# Patient Record
Sex: FEMALE | Race: WHITE | NOT HISPANIC OR LATINO
[De-identification: names, ages, dates, MRNs, and addresses within clinical notes are randomized per-mention and may not be internally consistent; named-entity substitution may affect disease eponyms.]

---

## 2017-04-17 ENCOUNTER — APPOINTMENT (OUTPATIENT)
Dept: OBGYN | Facility: CLINIC | Age: 67
End: 2017-04-17

## 2017-04-17 VITALS
DIASTOLIC BLOOD PRESSURE: 74 MMHG | BODY MASS INDEX: 22.47 KG/M2 | SYSTOLIC BLOOD PRESSURE: 119 MMHG | WEIGHT: 119 LBS | HEIGHT: 61 IN

## 2017-04-17 DIAGNOSIS — N94.818 OTHER VULVODYNIA: ICD-10-CM

## 2017-04-17 DIAGNOSIS — N89.8 OTHER SPECIFIED NONINFLAMMATORY DISORDERS OF VAGINA: ICD-10-CM

## 2017-04-17 RX ORDER — ROSUVASTATIN CALCIUM 20 MG/1
20 TABLET, FILM COATED ORAL
Qty: 90 | Refills: 0 | Status: COMPLETED | COMMUNITY
Start: 2016-11-02

## 2017-04-17 RX ORDER — AMOXICILLIN AND CLAVULANATE POTASSIUM 875; 125 MG/1; MG/1
875-125 TABLET, COATED ORAL
Qty: 20 | Refills: 0 | Status: COMPLETED | COMMUNITY
Start: 2017-03-23

## 2017-04-19 ENCOUNTER — OTHER (OUTPATIENT)
Age: 67
End: 2017-04-19

## 2017-04-19 LAB
CANDIDA VAG CYTO: NOT DETECTED
G VAGINALIS+PREV SP MTYP VAG QL MICRO: DETECTED
T VAGINALIS VAG QL WET PREP: NOT DETECTED

## 2017-04-19 RX ORDER — METRONIDAZOLE 7.5 MG/G
0.75 GEL VAGINAL
Qty: 7 | Refills: 1 | Status: ACTIVE | COMMUNITY
Start: 2017-04-19 | End: 1900-01-01

## 2020-02-20 ENCOUNTER — APPOINTMENT (OUTPATIENT)
Dept: OBGYN | Facility: CLINIC | Age: 70
End: 2020-02-20

## 2020-03-04 ENCOUNTER — APPOINTMENT (OUTPATIENT)
Dept: OBGYN | Facility: CLINIC | Age: 70
End: 2020-03-04
Payer: COMMERCIAL

## 2020-03-04 VITALS — BODY MASS INDEX: 23.24 KG/M2 | SYSTOLIC BLOOD PRESSURE: 132 MMHG | DIASTOLIC BLOOD PRESSURE: 78 MMHG | WEIGHT: 123 LBS

## 2020-03-04 DIAGNOSIS — Z01.419 ENCOUNTER FOR GYNECOLOGICAL EXAMINATION (GENERAL) (ROUTINE) W/OUT ABNORMAL FINDINGS: ICD-10-CM

## 2020-03-04 PROCEDURE — 99397 PER PM REEVAL EST PAT 65+ YR: CPT

## 2020-03-04 PROCEDURE — 82270 OCCULT BLOOD FECES: CPT

## 2020-03-04 NOTE — PHYSICAL EXAM
[Awake] : awake [Alert] : alert [Acute Distress] : no acute distress [Mass] : no breast mass [Nipple Discharge] : no nipple discharge [Axillary LAD] : no axillary lymphadenopathy [Soft] : soft [Oriented x3] : oriented to person, place, and time [Tender] : non tender [Normal] : vagina [No Bleeding] : there was no active vaginal bleeding [Absent] : absent [Uterine Adnexae] : were not tender and not enlarged

## 2020-03-04 NOTE — HISTORY OF PRESENT ILLNESS
[___ Year(s) Ago] : [unfilled] year(s) ago [Regular Exercise] : regular exercise [Healthy Diet] : a healthy diet [Weight Concerns] : no concerns with her weight [Last Mammogram ___] : Last Mammogram was [unfilled] [Last Bone Density ___] : Last bone density studies [unfilled] [Last Colonoscopy ___] : Last colonoscopy [unfilled] [Postmenopausal] : is postmenopausal [Last Pap ___] : Last cervical pap smear was [unfilled] [Currently In Menopause] : currently in menopause [Experiencing Menopausal Sxs] : not experiencing menopausal symptoms [Menopause Age: ____] : age at menopause was [unfilled]

## 2020-08-14 ENCOUNTER — NON-APPOINTMENT (OUTPATIENT)
Age: 70
End: 2020-08-14

## 2022-02-11 ENCOUNTER — INPATIENT (INPATIENT)
Facility: HOSPITAL | Age: 72
LOS: 9 days | Discharge: HOME | DRG: 535 | End: 2022-02-21
Attending: INTERNAL MEDICINE | Admitting: INTERNAL MEDICINE
Payer: COMMERCIAL

## 2022-02-11 VITALS
WEIGHT: 123.02 LBS | SYSTOLIC BLOOD PRESSURE: 154 MMHG | RESPIRATION RATE: 18 BRPM | TEMPERATURE: 99 F | OXYGEN SATURATION: 100 % | HEART RATE: 81 BPM | DIASTOLIC BLOOD PRESSURE: 94 MMHG

## 2022-02-11 DIAGNOSIS — S32.9XXA FRACTURE OF UNSPECIFIED PARTS OF LUMBOSACRAL SPINE AND PELVIS, INITIAL ENCOUNTER FOR CLOSED FRACTURE: ICD-10-CM

## 2022-02-11 LAB
ALBUMIN SERPL ELPH-MCNC: 3.9 G/DL — SIGNIFICANT CHANGE UP (ref 3.3–5)
ALP SERPL-CCNC: 75 U/L — SIGNIFICANT CHANGE UP (ref 40–120)
ALT FLD-CCNC: 46 U/L — SIGNIFICANT CHANGE UP (ref 12–78)
ANION GAP SERPL CALC-SCNC: 4 MMOL/L — LOW (ref 5–17)
AST SERPL-CCNC: 32 U/L — SIGNIFICANT CHANGE UP (ref 15–37)
BASOPHILS # BLD AUTO: 0.03 K/UL — SIGNIFICANT CHANGE UP (ref 0–0.2)
BASOPHILS NFR BLD AUTO: 0.2 % — SIGNIFICANT CHANGE UP (ref 0–2)
BILIRUB SERPL-MCNC: 0.4 MG/DL — SIGNIFICANT CHANGE UP (ref 0.2–1.2)
BUN SERPL-MCNC: 15 MG/DL — SIGNIFICANT CHANGE UP (ref 7–23)
CALCIUM SERPL-MCNC: 9.2 MG/DL — SIGNIFICANT CHANGE UP (ref 8.5–10.1)
CHLORIDE SERPL-SCNC: 111 MMOL/L — HIGH (ref 96–108)
CO2 SERPL-SCNC: 27 MMOL/L — SIGNIFICANT CHANGE UP (ref 22–31)
CREAT SERPL-MCNC: 0.73 MG/DL — SIGNIFICANT CHANGE UP (ref 0.5–1.3)
EOSINOPHIL # BLD AUTO: 0.02 K/UL — SIGNIFICANT CHANGE UP (ref 0–0.5)
EOSINOPHIL NFR BLD AUTO: 0.1 % — SIGNIFICANT CHANGE UP (ref 0–6)
GLUCOSE SERPL-MCNC: 126 MG/DL — HIGH (ref 70–99)
HCT VFR BLD CALC: 41.8 % — SIGNIFICANT CHANGE UP (ref 34.5–45)
HGB BLD-MCNC: 14.3 G/DL — SIGNIFICANT CHANGE UP (ref 11.5–15.5)
IMM GRANULOCYTES NFR BLD AUTO: 0.8 % — SIGNIFICANT CHANGE UP (ref 0–1.5)
INR BLD: 1 RATIO — SIGNIFICANT CHANGE UP (ref 0.88–1.16)
LYMPHOCYTES # BLD AUTO: 1.2 K/UL — SIGNIFICANT CHANGE UP (ref 1–3.3)
LYMPHOCYTES # BLD AUTO: 7.2 % — LOW (ref 13–44)
MCHC RBC-ENTMCNC: 29.7 PG — SIGNIFICANT CHANGE UP (ref 27–34)
MCHC RBC-ENTMCNC: 34.2 GM/DL — SIGNIFICANT CHANGE UP (ref 32–36)
MCV RBC AUTO: 86.9 FL — SIGNIFICANT CHANGE UP (ref 80–100)
MONOCYTES # BLD AUTO: 0.81 K/UL — SIGNIFICANT CHANGE UP (ref 0–0.9)
MONOCYTES NFR BLD AUTO: 4.8 % — SIGNIFICANT CHANGE UP (ref 2–14)
NEUTROPHILS # BLD AUTO: 14.52 K/UL — HIGH (ref 1.8–7.4)
NEUTROPHILS NFR BLD AUTO: 86.9 % — HIGH (ref 43–77)
NRBC # BLD: 0 /100 WBCS — SIGNIFICANT CHANGE UP (ref 0–0)
PLATELET # BLD AUTO: 202 K/UL — SIGNIFICANT CHANGE UP (ref 150–400)
POTASSIUM SERPL-MCNC: 3.8 MMOL/L — SIGNIFICANT CHANGE UP (ref 3.5–5.3)
POTASSIUM SERPL-SCNC: 3.8 MMOL/L — SIGNIFICANT CHANGE UP (ref 3.5–5.3)
PROT SERPL-MCNC: 7.1 G/DL — SIGNIFICANT CHANGE UP (ref 6–8.3)
PROTHROM AB SERPL-ACNC: 11.7 SEC — SIGNIFICANT CHANGE UP (ref 10.6–13.6)
RBC # BLD: 4.81 M/UL — SIGNIFICANT CHANGE UP (ref 3.8–5.2)
RBC # FLD: 12.5 % — SIGNIFICANT CHANGE UP (ref 10.3–14.5)
SARS-COV-2 RNA SPEC QL NAA+PROBE: SIGNIFICANT CHANGE UP
SODIUM SERPL-SCNC: 142 MMOL/L — SIGNIFICANT CHANGE UP (ref 135–145)
WBC # BLD: 16.72 K/UL — HIGH (ref 3.8–10.5)
WBC # FLD AUTO: 16.72 K/UL — HIGH (ref 3.8–10.5)

## 2022-02-11 PROCEDURE — 93010 ELECTROCARDIOGRAM REPORT: CPT

## 2022-02-11 PROCEDURE — 72192 CT PELVIS W/O DYE: CPT | Mod: 26,MA

## 2022-02-11 PROCEDURE — 73502 X-RAY EXAM HIP UNI 2-3 VIEWS: CPT | Mod: 26,RT

## 2022-02-11 PROCEDURE — 99285 EMERGENCY DEPT VISIT HI MDM: CPT

## 2022-02-11 PROCEDURE — 71101 X-RAY EXAM UNILAT RIBS/CHEST: CPT | Mod: 26

## 2022-02-11 PROCEDURE — 76376 3D RENDER W/INTRP POSTPROCES: CPT | Mod: 26

## 2022-02-11 PROCEDURE — 72190 X-RAY EXAM OF PELVIS: CPT | Mod: 26,59

## 2022-02-11 PROCEDURE — 73552 X-RAY EXAM OF FEMUR 2/>: CPT | Mod: 26,RT

## 2022-02-11 RX ORDER — MORPHINE SULFATE 50 MG/1
4 CAPSULE, EXTENDED RELEASE ORAL ONCE
Refills: 0 | Status: DISCONTINUED | OUTPATIENT
Start: 2022-02-11 | End: 2022-02-11

## 2022-02-11 RX ORDER — ONDANSETRON 8 MG/1
4 TABLET, FILM COATED ORAL ONCE
Refills: 0 | Status: COMPLETED | OUTPATIENT
Start: 2022-02-11 | End: 2022-02-11

## 2022-02-11 RX ORDER — MORPHINE SULFATE 50 MG/1
2 CAPSULE, EXTENDED RELEASE ORAL
Refills: 0 | Status: DISCONTINUED | OUTPATIENT
Start: 2022-02-11 | End: 2022-02-13

## 2022-02-11 RX ORDER — DIAZEPAM 5 MG
2.5 TABLET ORAL ONCE
Refills: 0 | Status: DISCONTINUED | OUTPATIENT
Start: 2022-02-11 | End: 2022-02-11

## 2022-02-11 RX ORDER — OXYCODONE AND ACETAMINOPHEN 5; 325 MG/1; MG/1
1 TABLET ORAL EVERY 6 HOURS
Refills: 0 | Status: DISCONTINUED | OUTPATIENT
Start: 2022-02-11 | End: 2022-02-18

## 2022-02-11 RX ORDER — ENOXAPARIN SODIUM 100 MG/ML
40 INJECTION SUBCUTANEOUS DAILY
Refills: 0 | Status: DISCONTINUED | OUTPATIENT
Start: 2022-02-11 | End: 2022-02-21

## 2022-02-11 RX ORDER — ACETAMINOPHEN 500 MG
650 TABLET ORAL EVERY 6 HOURS
Refills: 0 | Status: DISCONTINUED | OUTPATIENT
Start: 2022-02-11 | End: 2022-02-21

## 2022-02-11 RX ORDER — HYDROMORPHONE HYDROCHLORIDE 2 MG/ML
0.5 INJECTION INTRAMUSCULAR; INTRAVENOUS; SUBCUTANEOUS ONCE
Refills: 0 | Status: DISCONTINUED | OUTPATIENT
Start: 2022-02-11 | End: 2022-02-11

## 2022-02-11 RX ORDER — SODIUM CHLORIDE 9 MG/ML
1000 INJECTION INTRAMUSCULAR; INTRAVENOUS; SUBCUTANEOUS ONCE
Refills: 0 | Status: COMPLETED | OUTPATIENT
Start: 2022-02-11 | End: 2022-02-11

## 2022-02-11 RX ADMIN — HYDROMORPHONE HYDROCHLORIDE 0.5 MILLIGRAM(S): 2 INJECTION INTRAMUSCULAR; INTRAVENOUS; SUBCUTANEOUS at 18:23

## 2022-02-11 RX ADMIN — SODIUM CHLORIDE 1000 MILLILITER(S): 9 INJECTION INTRAMUSCULAR; INTRAVENOUS; SUBCUTANEOUS at 17:34

## 2022-02-11 RX ADMIN — MORPHINE SULFATE 4 MILLIGRAM(S): 50 CAPSULE, EXTENDED RELEASE ORAL at 18:23

## 2022-02-11 RX ADMIN — HYDROMORPHONE HYDROCHLORIDE 0.5 MILLIGRAM(S): 2 INJECTION INTRAMUSCULAR; INTRAVENOUS; SUBCUTANEOUS at 19:32

## 2022-02-11 RX ADMIN — ONDANSETRON 4 MILLIGRAM(S): 8 TABLET, FILM COATED ORAL at 16:34

## 2022-02-11 RX ADMIN — Medication 2.5 MILLIGRAM(S): at 17:56

## 2022-02-11 RX ADMIN — MORPHINE SULFATE 4 MILLIGRAM(S): 50 CAPSULE, EXTENDED RELEASE ORAL at 16:34

## 2022-02-11 RX ADMIN — SODIUM CHLORIDE 1000 MILLILITER(S): 9 INJECTION INTRAMUSCULAR; INTRAVENOUS; SUBCUTANEOUS at 16:34

## 2022-02-11 NOTE — H&P ADULT - NSHPPHYSICALEXAM_GEN_ALL_CORE
General: WN/WD NAD  PERRLA  Neurology: A&Ox3, nonfocal, ULLOA x 4  Respiratory: CTA B/L  CV: RRR, S1S2, no murmurs, rubs or gallops  Abdominal: Soft, NT, ND +BS, Last BM  Extremities: No edema, + peripheral pulses  Skin Normal

## 2022-02-11 NOTE — ED PROVIDER NOTE - PROGRESS NOTE DETAILS
case d/w ortho spoke with JOAQUINA garcia advised ct scan non surgical will admit for pain control and PT

## 2022-02-11 NOTE — ED ADULT NURSE NOTE - NSIMPLEMENTINTERV_GEN_ALL_ED
Implemented All Fall with Harm Risk Interventions:  [place] to call system. Call bell, personal items and telephone within reach. Instruct patient to call for assistance. Room bathroom lighting operational. Non-slip footwear when patient is off stretcher. Physically safe environment: no spills, clutter or unnecessary equipment. Stretcher in lowest position, wheels locked, appropriate side rails in place. Provide visual cue, wrist band, yellow gown, etc. Monitor gait and stability. Monitor for mental status changes and reorient to person, place, and time. Review medications for side effects contributing to fall risk. Reinforce activity limits and safety measures with patient and family. Provide visual clues: red socks.

## 2022-02-11 NOTE — ED PROVIDER NOTE - OBJECTIVE STATEMENT
Pt is a 72 yo female with pmhx of hld and hypothyroidism here for right hip/groin pain s/p fall. She was on a playground with her grandson and was running after him. She reports that her foot hit the ledge she lost her balance and fell approx 18 inches. She reports that she landed on her right side. Denies striking her head and denies LOC. Denies neck pain, N/V, CP, SOB, abd pain. Denies numbness or tingling in ext. Denies back pain. Pt does report gluteal pain and right hip. Pt was unable to get up on her own.  Pt is not on AC.   pcp Kong Johnson

## 2022-02-11 NOTE — ED PROVIDER NOTE - ATTENDING CONTRIBUTION TO CARE
Pt is a 72 yo female who presents to the ED with a cc of pain s/p fall. Pt is a 72 yo female who presents to the ED with a cc of pain s/p fall. PMHx of HLD, hypothyroidism. She was on a playground with her grandson and was running after him. She reports that her foot hit the ledge she lost her balance and fell approx 18 inches. She reports that she landed on her right side. Denies striking her head and denies LOC. Denies neck pain, N/V, CP, SOB, abd pain. Denies numbness or tingling in ext. Denies back pain. Pt does report gluteal pain and right hip. Pt was unable to get up on her own. Denies prior injury to her right hip or pelvis. Pt is not on AC. Last po intake around noon    PMD: Kong Johnson  COVID 19: Moderna x 2 Pt is a 72 yo female who presents to the ED with a cc of pain s/p fall. PMHx of HLD, hypothyroidism. She was on a playground with her grandson and was running after him. She reports that her foot hit the ledge she lost her balance and fell approx 18 inches. She reports that she landed on her right side. Denies striking her head and denies LOC. Denies neck pain, N/V, CP, SOB, abd pain. Denies numbness or tingling in ext. Denies back pain. Pt does report gluteal pain and right hip. Pt was unable to get up on her own. Denies prior injury to her right hip or pelvis. Pt is not on AC. Last po intake around noon. Unsure of date of last Tetanus.     abrasion to right elbow  healing burn to right hand dorsal aspect    PMD: Kong Johnson  COVID 19: Moderna x 2 Pt is a 72 yo female who presents to the ED with a cc of pain s/p fall. PMHx of HLD, hypothyroidism. She was on a playground with her grandson and was running after him. She reports that her foot hit the ledge she lost her balance and fell approx 18 inches. She reports that she landed on her right side. Denies striking her head and denies LOC. Denies neck pain, N/V, CP, SOB, abd pain. Denies numbness or tingling in ext. Denies back pain. Pt does report gluteal pain and right hip. Pt was unable to get up on her own. Denies prior injury to her right hip or pelvis. Pt is not on AC. Last po intake around noon. Unsure of date of last Tetanus. On exam pt lying in bed appears to in mid pain distress. NCAT, PERRL, EOMI, heart RR, lungs CTA, abd soft NT/ND. No midline C/T/L TTP. No ecchymosis noted to the back. Diffuse TTP to right hip pt currently having spasm to right upper thigh. FROM of bilateral UE NVI x 2. NO TTP to LLE NVI. No TTP to right knee, tib/fib, ankle, foot. NVI. Pt presenting for right hip pain s/p fall high suspcion for fracture NVI. Will obtain screening labs, x-ray and medicate for pain     abrasion to right elbow  healing burn to right hand dorsal aspect    PMD: Kong Johnson  COVID 19: Moderna x 2 Pt is a 72 yo female who presents to the ED with a cc of pain s/p fall. PMHx of HLD, hypothyroidism. She was on a playground with her grandson and was running after him. She reports that her foot hit the ledge she lost her balance and fell approx 18 inches. She reports that she landed on her right side. Denies striking her head and denies LOC. Denies neck pain, N/V, CP, SOB, abd pain. Denies numbness or tingling in ext. Denies back pain. Pt does report gluteal pain and right hip. Pt was unable to get up on her own. Denies prior injury to her right hip or pelvis. Pt is not on AC. Last po intake around noon. Unsure of date of last Tetanus. On exam pt lying in bed appears to in mid pain distress. NCAT, PERRL, EOMI, heart RR, lungs CTA, abd soft NT/ND. No midline C/T/L TTP. No ecchymosis noted to the back. Diffuse TTP to right hip pt currently having spasm to right upper thigh. FROM of bilateral UE NVI x 2. Abrasion noted to right elbow with no abbie TTP and FROM. Healing burn to right hand dorsal aspect with no evidence of superimposed infection. NO TTP to LLE NVI. No TTP to right knee, tib/fib, ankle, foot. NVI. Pt presenting for right hip pain s/p fall high suspicion for fracture NVI. Will obtain screening labs, x-ray and medicate for pain       PMD: Kong Johnson  COVID 19: Moderna x 2

## 2022-02-11 NOTE — H&P ADULT - NSHPLABSRESULTS_GEN_ALL_CORE
Lab Results:  CBC  CBC Full  -  ( 11 Feb 2022 16:24 )  WBC Count : 16.72 K/uL  RBC Count : 4.81 M/uL  Hemoglobin : 14.3 g/dL  Hematocrit : 41.8 %  Platelet Count - Automated : 202 K/uL  Mean Cell Volume : 86.9 fl  Mean Cell Hemoglobin : 29.7 pg  Mean Cell Hemoglobin Concentration : 34.2 gm/dL  Auto Neutrophil # : 14.52 K/uL  Auto Lymphocyte # : 1.20 K/uL  Auto Monocyte # : 0.81 K/uL  Auto Eosinophil # : 0.02 K/uL  Auto Basophil # : 0.03 K/uL  Auto Neutrophil % : 86.9 %  Auto Lymphocyte % : 7.2 %  Auto Monocyte % : 4.8 %  Auto Eosinophil % : 0.1 %  Auto Basophil % : 0.2 %    .		Differential:	[] Automated		[] Manual  Chemistry                        14.3   16.72 )-----------( 202      ( 11 Feb 2022 16:24 )             41.8     02-11    142  |  111<H>  |  15  ----------------------------<  126<H>  3.8   |  27  |  0.73    Ca    9.2      11 Feb 2022 16:24    TPro  7.1  /  Alb  3.9  /  TBili  0.4  /  DBili  x   /  AST  32  /  ALT  46  /  AlkPhos  75  02-11    LIVER FUNCTIONS - ( 11 Feb 2022 16:24 )  Alb: 3.9 g/dL / Pro: 7.1 g/dL / ALK PHOS: 75 U/L / ALT: 46 U/L / AST: 32 U/L / GGT: x           PT/INR - ( 11 Feb 2022 16:24 )   PT: 11.7 sec;   INR: 1.00 ratio                   MICROBIOLOGY/CULTURES:      RADIOLOGY RESULTS: reviewed

## 2022-02-11 NOTE — H&P ADULT - HISTORY OF PRESENT ILLNESS
70 yo female with pmhx of hld and hypothyroidism here for right hip/groin pain s/p fall. She was on a playground with her grandson and was running after him. She reports that her foot hit the ledge she lost her balance and fell approx 18 inches. She reports that she landed on her right side. Denies striking her head and denies LOC. Denies neck pain, N/V, CP, SOB, abd pain. Denies numbness or tingling in ext. Denies back pain. Pt does report gluteal pain and right hip. Pt was unable to get up on her own.  Pt is not on AC.

## 2022-02-11 NOTE — H&P ADULT - ASSESSMENT
70 yo female with pmhx of hld and hypothyroidism here for right hip/groin pain s/p fall. She was on a playground with her grandson and was running after him. She reports that her foot hit the ledge she lost her balance and fell approx 18 inches. She reports that she landed on her right side. Denies striking her head and denies LOC. Denies neck pain, N/V, CP, SOB, abd pain. Denies numbness or tingling in ext. Denies back pain. Pt does report gluteal pain and right hip. Pt was unable to get up on her own.  Pt is not on AC.    1 Hip fracture  - ortho fu  - pain control  - no surgical intervention as per ortho  - WB as per ortho     2 HLD  - cw stain    3 Hypothyroid  - cw synthroid    Lovenox for DVT prophylaxis

## 2022-02-12 LAB
ALBUMIN SERPL ELPH-MCNC: 3.3 G/DL — SIGNIFICANT CHANGE UP (ref 3.3–5)
ALP SERPL-CCNC: 65 U/L — SIGNIFICANT CHANGE UP (ref 40–120)
ALT FLD-CCNC: 37 U/L — SIGNIFICANT CHANGE UP (ref 12–78)
ANION GAP SERPL CALC-SCNC: 4 MMOL/L — LOW (ref 5–17)
AST SERPL-CCNC: 26 U/L — SIGNIFICANT CHANGE UP (ref 15–37)
BILIRUB SERPL-MCNC: 0.7 MG/DL — SIGNIFICANT CHANGE UP (ref 0.2–1.2)
BUN SERPL-MCNC: 12 MG/DL — SIGNIFICANT CHANGE UP (ref 7–23)
CALCIUM SERPL-MCNC: 8.1 MG/DL — LOW (ref 8.5–10.1)
CHLORIDE SERPL-SCNC: 111 MMOL/L — HIGH (ref 96–108)
CO2 SERPL-SCNC: 25 MMOL/L — SIGNIFICANT CHANGE UP (ref 22–31)
CREAT SERPL-MCNC: 0.58 MG/DL — SIGNIFICANT CHANGE UP (ref 0.5–1.3)
GLUCOSE SERPL-MCNC: 112 MG/DL — HIGH (ref 70–99)
HCT VFR BLD CALC: 39.2 % — SIGNIFICANT CHANGE UP (ref 34.5–45)
HCV AB S/CO SERPL IA: 0.12 S/CO — SIGNIFICANT CHANGE UP (ref 0–0.99)
HCV AB SERPL-IMP: SIGNIFICANT CHANGE UP
HGB BLD-MCNC: 13.3 G/DL — SIGNIFICANT CHANGE UP (ref 11.5–15.5)
MCHC RBC-ENTMCNC: 29.6 PG — SIGNIFICANT CHANGE UP (ref 27–34)
MCHC RBC-ENTMCNC: 33.9 GM/DL — SIGNIFICANT CHANGE UP (ref 32–36)
MCV RBC AUTO: 87.3 FL — SIGNIFICANT CHANGE UP (ref 80–100)
NRBC # BLD: 0 /100 WBCS — SIGNIFICANT CHANGE UP (ref 0–0)
PLATELET # BLD AUTO: 183 K/UL — SIGNIFICANT CHANGE UP (ref 150–400)
POTASSIUM SERPL-MCNC: 3.8 MMOL/L — SIGNIFICANT CHANGE UP (ref 3.5–5.3)
POTASSIUM SERPL-SCNC: 3.8 MMOL/L — SIGNIFICANT CHANGE UP (ref 3.5–5.3)
PROT SERPL-MCNC: 6.5 G/DL — SIGNIFICANT CHANGE UP (ref 6–8.3)
RBC # BLD: 4.49 M/UL — SIGNIFICANT CHANGE UP (ref 3.8–5.2)
RBC # FLD: 12.4 % — SIGNIFICANT CHANGE UP (ref 10.3–14.5)
SODIUM SERPL-SCNC: 140 MMOL/L — SIGNIFICANT CHANGE UP (ref 135–145)
WBC # BLD: 11.29 K/UL — HIGH (ref 3.8–10.5)
WBC # FLD AUTO: 11.29 K/UL — HIGH (ref 3.8–10.5)

## 2022-02-12 PROCEDURE — 72190 X-RAY EXAM OF PELVIS: CPT | Mod: 26

## 2022-02-12 RX ORDER — LEVOTHYROXINE SODIUM 125 MCG
100 TABLET ORAL DAILY
Refills: 0 | Status: DISCONTINUED | OUTPATIENT
Start: 2022-02-12 | End: 2022-02-21

## 2022-02-12 RX ORDER — INFLUENZA VIRUS VACCINE 15; 15; 15; 15 UG/.5ML; UG/.5ML; UG/.5ML; UG/.5ML
0.7 SUSPENSION INTRAMUSCULAR ONCE
Refills: 0 | Status: DISCONTINUED | OUTPATIENT
Start: 2022-02-12 | End: 2022-02-21

## 2022-02-12 RX ORDER — ATORVASTATIN CALCIUM 80 MG/1
10 TABLET, FILM COATED ORAL AT BEDTIME
Refills: 0 | Status: DISCONTINUED | OUTPATIENT
Start: 2022-02-12 | End: 2022-02-13

## 2022-02-12 RX ADMIN — MORPHINE SULFATE 2 MILLIGRAM(S): 50 CAPSULE, EXTENDED RELEASE ORAL at 11:30

## 2022-02-12 RX ADMIN — MORPHINE SULFATE 2 MILLIGRAM(S): 50 CAPSULE, EXTENDED RELEASE ORAL at 08:27

## 2022-02-12 RX ADMIN — MORPHINE SULFATE 2 MILLIGRAM(S): 50 CAPSULE, EXTENDED RELEASE ORAL at 13:19

## 2022-02-12 RX ADMIN — ENOXAPARIN SODIUM 40 MILLIGRAM(S): 100 INJECTION SUBCUTANEOUS at 11:12

## 2022-02-12 RX ADMIN — ATORVASTATIN CALCIUM 10 MILLIGRAM(S): 80 TABLET, FILM COATED ORAL at 22:08

## 2022-02-12 RX ADMIN — MORPHINE SULFATE 2 MILLIGRAM(S): 50 CAPSULE, EXTENDED RELEASE ORAL at 22:08

## 2022-02-12 RX ADMIN — MORPHINE SULFATE 2 MILLIGRAM(S): 50 CAPSULE, EXTENDED RELEASE ORAL at 13:16

## 2022-02-12 RX ADMIN — MORPHINE SULFATE 2 MILLIGRAM(S): 50 CAPSULE, EXTENDED RELEASE ORAL at 08:41

## 2022-02-12 NOTE — PROGRESS NOTE ADULT - SUBJECTIVE AND OBJECTIVE BOX
Patient seen and examined at bedside. Endorses right sided groin pain and generalized stiffness due to right groin pain. Denies numbness/tingling. States shes has not been able to ambulate due to pain. Denies any abbie pain elsewhere and is actively able to move all extremities other than RLE which is limited due to right groin pain. however FROM of right ankle.     Vital Signs Last 24 Hrs  T(C): 37 (02-11-22 @ 23:51), Max: 37.1 (02-11-22 @ 14:58)  T(F): 98.6 (02-11-22 @ 23:51), Max: 98.8 (02-11-22 @ 14:58)  HR: 84 (02-11-22 @ 23:51) (81 - 91)  BP: 143/85 (02-11-22 @ 23:51) (131/80 - 154/94)  RR: 16 (02-11-22 @ 23:51) (16 - 18)  SpO2: 94% (02-11-22 @ 23:51) (94% - 100%)                        13.3   11.29 )-----------( 183      ( 12 Feb 2022 06:27 )             39.2     12 Feb 2022 06:27    140    |  111    |  12     ----------------------------<  112    3.8     |  25     |  0.58     Ca    8.1        12 Feb 2022 06:27    TPro  6.5    /  Alb  3.3    /  TBili  0.7    /  DBili  x      /  AST  26     /  ALT  37     /  AlkPhos  65     12 Feb 2022 06:27    PT/INR - ( 11 Feb 2022 16:24 )   PT: 11.7 sec;   INR: 1.00 ratio             Exam:  Gen: NAD, resting comfortably, aaox3  RLE  Skin intact, no obvious signs of deformity, ecchymosis or swelling  Limited ROM of hip and knee 2/2 hip pain  FROM of ankle  +EHL/FHL/TA/GS  SILT deep per/superficial per/saph/sural  +DP  Calf NTTP b/l  Compartments soft and compressible    A/P:  71yFemale R LC1 fracture     -WBAT  -Pain control as needed   -PT/OT  -DVT ppx per primary team, SCDs  -Primary care per medicine team   -Will discuss plan of care and imaging with Dr. Kumari and update  Patient seen and examined at bedside. Endorses right sided groin pain and generalized stiffness due to right groin pain. Denies numbness/tingling. States shes has not been able to ambulate due to pain. Denies any abbie pain elsewhere and is actively able to move all extremities other than RLE which is limited due to right groin pain. however FROM of right ankle.     Vital Signs Last 24 Hrs  T(C): 37 (02-11-22 @ 23:51), Max: 37.1 (02-11-22 @ 14:58)  T(F): 98.6 (02-11-22 @ 23:51), Max: 98.8 (02-11-22 @ 14:58)  HR: 84 (02-11-22 @ 23:51) (81 - 91)  BP: 143/85 (02-11-22 @ 23:51) (131/80 - 154/94)  RR: 16 (02-11-22 @ 23:51) (16 - 18)  SpO2: 94% (02-11-22 @ 23:51) (94% - 100%)                        13.3   11.29 )-----------( 183      ( 12 Feb 2022 06:27 )             39.2     12 Feb 2022 06:27    140    |  111    |  12     ----------------------------<  112    3.8     |  25     |  0.58     Ca    8.1        12 Feb 2022 06:27    TPro  6.5    /  Alb  3.3    /  TBili  0.7    /  DBili  x      /  AST  26     /  ALT  37     /  AlkPhos  65     12 Feb 2022 06:27    PT/INR - ( 11 Feb 2022 16:24 )   PT: 11.7 sec;   INR: 1.00 ratio             Exam:  Gen: NAD, resting comfortably, aaox3  RLE  Skin intact, no obvious signs of deformity, ecchymosis or swelling  Limited ROM of hip and knee 2/2 hip pain  FROM of ankle  +EHL/FHL/TA/GS  SILT deep per/superficial per/saph/sural  +DP  Calf NTTP b/l  Compartments soft and compressible    A/P:  71yFemale R LC1 fracture     -NWB  -Pain control as needed   -PT/OT  -DVT ppx per primary team, SCDs  -Primary care per medicine team   -Will discuss plan of care and imaging with Dr. Kumari and update  Patient seen and examined at bedside. Endorses right sided groin pain and generalized stiffness due to right groin pain. Denies numbness/tingling. States shes has not been able to ambulate due to pain. Denies any abbie pain elsewhere and is actively able to move all extremities other than RLE which is limited due to right groin pain. however FROM of right ankle.     Vital Signs Last 24 Hrs  T(C): 37 (02-11-22 @ 23:51), Max: 37.1 (02-11-22 @ 14:58)  T(F): 98.6 (02-11-22 @ 23:51), Max: 98.8 (02-11-22 @ 14:58)  HR: 84 (02-11-22 @ 23:51) (81 - 91)  BP: 143/85 (02-11-22 @ 23:51) (131/80 - 154/94)  RR: 16 (02-11-22 @ 23:51) (16 - 18)  SpO2: 94% (02-11-22 @ 23:51) (94% - 100%)                        13.3   11.29 )-----------( 183      ( 12 Feb 2022 06:27 )             39.2     12 Feb 2022 06:27    140    |  111    |  12     ----------------------------<  112    3.8     |  25     |  0.58     Ca    8.1        12 Feb 2022 06:27    TPro  6.5    /  Alb  3.3    /  TBili  0.7    /  DBili  x      /  AST  26     /  ALT  37     /  AlkPhos  65     12 Feb 2022 06:27    PT/INR - ( 11 Feb 2022 16:24 )   PT: 11.7 sec;   INR: 1.00 ratio             Exam:  Gen: NAD, resting comfortably, aaox3  RLE  Skin intact, no obvious signs of deformity, ecchymosis or swelling  Limited ROM of hip and knee 2/2 hip pain  FROM of ankle  +EHL/FHL/TA/GS  SILT deep per/superficial per/saph/sural  +DP  Calf NTTP b/l  Compartments soft and compressible    Imaging:  CT Pelvis shows nondisplaced right ant/post wall fracture with associated pubic rami fx      A/P:  71yFemale with nondisplaced right ant/post wall fracture with associated pubic rami fx    -NWB RLE  -Pain control as needed   -PT/OT  -DVT ppx per primary team, SCDs  -Primary care per medicine team   -Will discuss plan of care and imaging with Dr. Kumari and update  Patient seen and examined at bedside. Endorses right sided groin pain and generalized stiffness due to right groin pain. Denies numbness/tingling. States shes has not been able to ambulate due to pain. Denies any abbie pain elsewhere and is actively able to move all extremities other than RLE which is limited due to right groin pain. however FROM of right ankle.     Vital Signs Last 24 Hrs  T(C): 37 (02-11-22 @ 23:51), Max: 37.1 (02-11-22 @ 14:58)  T(F): 98.6 (02-11-22 @ 23:51), Max: 98.8 (02-11-22 @ 14:58)  HR: 84 (02-11-22 @ 23:51) (81 - 91)  BP: 143/85 (02-11-22 @ 23:51) (131/80 - 154/94)  RR: 16 (02-11-22 @ 23:51) (16 - 18)  SpO2: 94% (02-11-22 @ 23:51) (94% - 100%)                        13.3   11.29 )-----------( 183      ( 12 Feb 2022 06:27 )             39.2     12 Feb 2022 06:27    140    |  111    |  12     ----------------------------<  112    3.8     |  25     |  0.58     Ca    8.1        12 Feb 2022 06:27    TPro  6.5    /  Alb  3.3    /  TBili  0.7    /  DBili  x      /  AST  26     /  ALT  37     /  AlkPhos  65     12 Feb 2022 06:27    PT/INR - ( 11 Feb 2022 16:24 )   PT: 11.7 sec;   INR: 1.00 ratio             Exam:  Gen: NAD, resting comfortably, aaox3  RLE  Skin intact, no obvious signs of deformity, ecchymosis or swelling  Limited ROM of hip and knee 2/2 hip pain  FROM of ankle  +EHL/FHL/TA/GS  SILT deep per/superficial per/saph/sural  +DP  Calf NTTP b/l  Compartments soft and compressible    Imaging:  CT Pelvis demonstrates: right sacral fracture which extends into the right SI joint. There is also an fx involving the anterior wall of the right acetabulum which extends into the junction with the right superior pubic ramus. There is a 0.1 cm intra-articular step-off at the right acetabulum anteriorly. There is a mildly comminuted fracture extending down the right pubic body which extends into the pubic symphysis. There is also a minimally angulated right inferior pubic ramus fracture. An additional mildly comminuted nondisplaced fracture begins at the posterior wall of the right acetabulum and extends down the ischium. B/l hip arthrosis. There is mild pubic symphysis arthrosis. There is minimal disc bulge at L4-5. A rounded low signal region within the left iliopsoas muscle which measures 2.4 cm may represent a myxoma.        A/P:  71yFemale with LC1 type injury w/ assoc nondisplaced right ant/post wall fracture     -NWB RLE  -assistive devices as needed  -repeat imaging in 1 week  -Pain control as needed   -PT/OT  -DVT ppx per primary team, SCDs  -Primary care per medicine team   -No acute orthopaedic surgical intervention indicated at present  -Orthopaedically stable for discharge  -If ant/post wall fractures displace and / or patient's hip becomes unstable, then consultation for surgical intervention with a traumatologist would be recommended   -Risks, benefits and potential need for future surgical intervention discussed   -Repeat imaging recommended in 1 week   -Follow up with Dr. Kumari in 1 week   -Discussed case and imaging with Dr. Kumari who is aware and approves  -Will advise if plan changes

## 2022-02-12 NOTE — ED ADULT NURSE REASSESSMENT NOTE - NSIMPLEMENTINTERV_GEN_ALL_ED
Implemented All Fall with Harm Risk Interventions:  [place] to call system. Call bell, personal items and telephone within reach. Instruct patient to call for assistance. Room bathroom lighting operational. Non-slip footwear when patient is off stretcher. Physically safe environment: no spills, clutter or unnecessary equipment. Stretcher in lowest position, wheels locked, appropriate side rails in place. Provide visual cue, wrist band, yellow gown, etc. Monitor gait and stability. Monitor for mental status changes and reorient to person, place, and time. Review medications for side effects contributing to fall risk. Reinforce activity limits and safety measures with patient and family. Provide visual clues: red socks.

## 2022-02-12 NOTE — PATIENT PROFILE ADULT - FALL HARM RISK - HARM RISK INTERVENTIONS
Assistance with ambulation/Assistance OOB with selected safe patient handling equipment/Communicate Risk of Fall with Harm to all staff/Discuss with provider need for PT consult/Monitor gait and stability/Reinforce activity limits and safety measures with patient and family/Tailored Fall Risk Interventions/Visual Cue: Yellow wristband and red socks/Bed in lowest position, wheels locked, appropriate side rails in place/Call bell, personal items and telephone in reach/Instruct patient to call for assistance before getting out of bed or chair/Non-slip footwear when patient is out of bed/[place] to call system/Physically safe environment - no spills, clutter or unnecessary equipment/Purposeful Proactive Rounding/Room/bathroom lighting operational, light cord in reach

## 2022-02-12 NOTE — ED ADULT NURSE REASSESSMENT NOTE - NS ED NURSE REASSESS COMMENT FT1
Patient is sleeping at this time. Will continue to monitor.
Patient unable to urinate/ noted bladder distended. Bladder scanned and noted 678ml. Dr. Ruggiero made aware and ordered to do straight cath- done with output of 650ml. Patient feeling better.
Pt resting comfortably in bed at this time, offers no complaints.  Given morphine 2mg ivp earlier for pelvic pain with relief.  Pt awaiting medical bed.  Bedrest maintained. Female external catheter placed.  Will continue to monitor.

## 2022-02-12 NOTE — PROGRESS NOTE ADULT - SUBJECTIVE AND OBJECTIVE BOX
Patient is a 71y old  Female who presents with a chief complaint of sp fall (12 Feb 2022 07:35)    Date of servie : 02-12-22 @ 14:23  INTERVAL HPI/OVERNIGHT EVENTS:  T(C): 36.8 (02-12-22 @ 12:30), Max: 37.2 (02-12-22 @ 11:07)  HR: 81 (02-12-22 @ 12:30) (79 - 91)  BP: 146/82 (02-12-22 @ 12:30) (131/80 - 154/94)  RR: 17 (02-12-22 @ 12:30) (16 - 18)  SpO2: 93% (02-12-22 @ 12:30) (93% - 100%)  Wt(kg): --  I&O's Summary    11 Feb 2022 07:01  -  12 Feb 2022 07:00  --------------------------------------------------------  IN: 0 mL / OUT: 700 mL / NET: -700 mL        LABS:                        13.3   11.29 )-----------( 183      ( 12 Feb 2022 06:27 )             39.2     02-12    140  |  111<H>  |  12  ----------------------------<  112<H>  3.8   |  25  |  0.58    Ca    8.1<L>      12 Feb 2022 06:27    TPro  6.5  /  Alb  3.3  /  TBili  0.7  /  DBili  x   /  AST  26  /  ALT  37  /  AlkPhos  65  02-12    PT/INR - ( 11 Feb 2022 16:24 )   PT: 11.7 sec;   INR: 1.00 ratio             CAPILLARY BLOOD GLUCOSE                MEDICATIONS  (STANDING):  atorvastatin 10 milliGRAM(s) Oral at bedtime  enoxaparin Injectable 40 milliGRAM(s) SubCutaneous daily  levothyroxine 100 MICROGram(s) Oral daily    MEDICATIONS  (PRN):  acetaminophen     Tablet .. 650 milliGRAM(s) Oral every 6 hours PRN Temp greater or equal to 38C (100.4F), Mild Pain (1 - 3)  morphine  - Injectable 2 milliGRAM(s) IV Push every 3 hours PRN Severe Pain (7 - 10)  oxycodone    5 mG/acetaminophen 325 mG 1 Tablet(s) Oral every 6 hours PRN Moderate Pain (4 - 6)          PHYSICAL EXAM:  GENERAL: NAD, well-groomed, well-developed  HEAD:  Atraumatic, Normocephalic  CHEST/LUNG: Clear to percussion bilaterally; No rales, rhonchi, wheezing, or rubs  HEART: Regular rate and rhythm; No murmurs, rubs, or gallops  ABDOMEN: Soft, Nontender, Nondistended; Bowel sounds present  EXTREMITIES:  2+ Peripheral Pulses, No clubbing, cyanosis, or edema  LYMPH: No lymphadenopathy noted  SKIN: No rashes or lesions    Care Discussed with Consultants/Other Providers [ ] YES  [ ] NO

## 2022-02-13 LAB
HCT VFR BLD CALC: 40.8 % — SIGNIFICANT CHANGE UP (ref 34.5–45)
HGB BLD-MCNC: 13.6 G/DL — SIGNIFICANT CHANGE UP (ref 11.5–15.5)
MCHC RBC-ENTMCNC: 29.5 PG — SIGNIFICANT CHANGE UP (ref 27–34)
MCHC RBC-ENTMCNC: 33.3 GM/DL — SIGNIFICANT CHANGE UP (ref 32–36)
MCV RBC AUTO: 88.5 FL — SIGNIFICANT CHANGE UP (ref 80–100)
NRBC # BLD: 0 /100 WBCS — SIGNIFICANT CHANGE UP (ref 0–0)
PLATELET # BLD AUTO: 167 K/UL — SIGNIFICANT CHANGE UP (ref 150–400)
RBC # BLD: 4.61 M/UL — SIGNIFICANT CHANGE UP (ref 3.8–5.2)
RBC # FLD: 12.5 % — SIGNIFICANT CHANGE UP (ref 10.3–14.5)
WBC # BLD: 9.2 K/UL — SIGNIFICANT CHANGE UP (ref 3.8–10.5)
WBC # FLD AUTO: 9.2 K/UL — SIGNIFICANT CHANGE UP (ref 3.8–10.5)

## 2022-02-13 RX ORDER — ROSUVASTATIN CALCIUM 5 MG/1
1 TABLET ORAL
Qty: 0 | Refills: 0 | DISCHARGE

## 2022-02-13 RX ORDER — ATORVASTATIN CALCIUM 80 MG/1
80 TABLET, FILM COATED ORAL AT BEDTIME
Refills: 0 | Status: DISCONTINUED | OUTPATIENT
Start: 2022-02-13 | End: 2022-02-21

## 2022-02-13 RX ORDER — ATORVASTATIN CALCIUM 80 MG/1
1 TABLET, FILM COATED ORAL
Qty: 0 | Refills: 0 | DISCHARGE

## 2022-02-13 RX ORDER — SENNA PLUS 8.6 MG/1
2 TABLET ORAL AT BEDTIME
Refills: 0 | Status: DISCONTINUED | OUTPATIENT
Start: 2022-02-13 | End: 2022-02-21

## 2022-02-13 RX ORDER — POLYETHYLENE GLYCOL 3350 17 G/17G
17 POWDER, FOR SOLUTION ORAL DAILY
Refills: 0 | Status: DISCONTINUED | OUTPATIENT
Start: 2022-02-13 | End: 2022-02-21

## 2022-02-13 RX ORDER — LEVOTHYROXINE SODIUM 125 MCG
1 TABLET ORAL
Qty: 0 | Refills: 0 | DISCHARGE

## 2022-02-13 RX ORDER — EZETIMIBE 10 MG/1
1 TABLET ORAL
Qty: 0 | Refills: 0 | DISCHARGE

## 2022-02-13 RX ORDER — EZETIMIBE 10 MG/1
10 TABLET ORAL DAILY
Refills: 0 | Status: DISCONTINUED | OUTPATIENT
Start: 2022-02-13 | End: 2022-02-21

## 2022-02-13 RX ADMIN — SENNA PLUS 2 TABLET(S): 8.6 TABLET ORAL at 21:10

## 2022-02-13 RX ADMIN — OXYCODONE AND ACETAMINOPHEN 1 TABLET(S): 5; 325 TABLET ORAL at 11:49

## 2022-02-13 RX ADMIN — OXYCODONE AND ACETAMINOPHEN 1 TABLET(S): 5; 325 TABLET ORAL at 12:19

## 2022-02-13 RX ADMIN — ENOXAPARIN SODIUM 40 MILLIGRAM(S): 100 INJECTION SUBCUTANEOUS at 11:49

## 2022-02-13 RX ADMIN — ATORVASTATIN CALCIUM 80 MILLIGRAM(S): 80 TABLET, FILM COATED ORAL at 21:16

## 2022-02-13 RX ADMIN — MORPHINE SULFATE 2 MILLIGRAM(S): 50 CAPSULE, EXTENDED RELEASE ORAL at 09:10

## 2022-02-13 RX ADMIN — OXYCODONE AND ACETAMINOPHEN 1 TABLET(S): 5; 325 TABLET ORAL at 21:10

## 2022-02-13 RX ADMIN — MORPHINE SULFATE 2 MILLIGRAM(S): 50 CAPSULE, EXTENDED RELEASE ORAL at 08:40

## 2022-02-13 RX ADMIN — Medication 100 MICROGRAM(S): at 05:10

## 2022-02-13 RX ADMIN — OXYCODONE AND ACETAMINOPHEN 1 TABLET(S): 5; 325 TABLET ORAL at 22:00

## 2022-02-13 NOTE — PHARMACOTHERAPY INTERVENTION NOTE - COMMENTS
Medication reconciliation was completed by pharmacist. The following discrepancies were discussed with Dr. Naun Belle:    Current order                                               Home Medication  Atorvastatin 10 mg 1 tablet orally daily           Rosuvastatin 20 mg 1 tablet orally daily  Not ordered                                                 Zetia 10 mg 1 tablet orally daily      MD aware and would like to adjust medications to match home dose

## 2022-02-13 NOTE — PROGRESS NOTE ADULT - SUBJECTIVE AND OBJECTIVE BOX
Patient is a 71y old  Female who presents with a chief complaint of sp fall (12 Feb 2022 14:23)    Date of servie : 02-13-22 @ 06:39  INTERVAL HPI/OVERNIGHT EVENTS:  T(C): 37 (02-13-22 @ 05:27), Max: 37.5 (02-12-22 @ 20:59)  HR: 78 (02-13-22 @ 05:27) (78 - 93)  BP: 150/87 (02-13-22 @ 05:27) (145/88 - 150/87)  RR: 18 (02-13-22 @ 05:27) (16 - 18)  SpO2: 92% (02-13-22 @ 05:27) (92% - 94%)  Wt(kg): --  I&O's Summary    11 Feb 2022 07:01  -  12 Feb 2022 07:00  --------------------------------------------------------  IN: 0 mL / OUT: 700 mL / NET: -700 mL    12 Feb 2022 07:01  -  13 Feb 2022 06:39  --------------------------------------------------------  IN: 0 mL / OUT: 400 mL / NET: -400 mL        LABS:                        13.3   11.29 )-----------( 183      ( 12 Feb 2022 06:27 )             39.2     02-12    140  |  111<H>  |  12  ----------------------------<  112<H>  3.8   |  25  |  0.58    Ca    8.1<L>      12 Feb 2022 06:27    TPro  6.5  /  Alb  3.3  /  TBili  0.7  /  DBili  x   /  AST  26  /  ALT  37  /  AlkPhos  65  02-12    PT/INR - ( 11 Feb 2022 16:24 )   PT: 11.7 sec;   INR: 1.00 ratio             CAPILLARY BLOOD GLUCOSE                MEDICATIONS  (STANDING):  atorvastatin 10 milliGRAM(s) Oral at bedtime  enoxaparin Injectable 40 milliGRAM(s) SubCutaneous daily  influenza  Vaccine (HIGH DOSE) 0.7 milliLiter(s) IntraMuscular once  levothyroxine 100 MICROGram(s) Oral daily    MEDICATIONS  (PRN):  acetaminophen     Tablet .. 650 milliGRAM(s) Oral every 6 hours PRN Temp greater or equal to 38C (100.4F), Mild Pain (1 - 3)  morphine  - Injectable 2 milliGRAM(s) IV Push every 3 hours PRN Severe Pain (7 - 10)  oxycodone    5 mG/acetaminophen 325 mG 1 Tablet(s) Oral every 6 hours PRN Moderate Pain (4 - 6)          PHYSICAL EXAM:  GENERAL: NAD, well-groomed, well-developed  HEAD:  Atraumatic, Normocephalic  CHEST/LUNG: Clear to percussion bilaterally; No rales, rhonchi, wheezing, or rubs  HEART: Regular rate and rhythm; No murmurs, rubs, or gallops  ABDOMEN: Soft, Nontender, Nondistended; Bowel sounds present  EXTREMITIES:  2+ Peripheral Pulses, No clubbing, cyanosis, or edema  LYMPH: No lymphadenopathy noted  SKIN: No rashes or lesions    Care Discussed with Consultants/Other Providers [x ] YES  [ ] NO

## 2022-02-13 NOTE — PROGRESS NOTE ADULT - ASSESSMENT
70 yo female with pmhx of hld and hypothyroidism here for right hip/groin pain s/p fall. She was on a playground with her grandson and was running after him. She reports that her foot hit the ledge she lost her balance and fell approx 18 inches. She reports that she landed on her right side. Denies striking her head and denies LOC. Denies neck pain, N/V, CP, SOB, abd pain. Denies numbness or tingling in ext. Denies back pain. Pt does report gluteal pain and right hip. Pt was unable to get up on her own.  Pt is not on AC.    1 Hip fracture  - ortho fu  - pain control  - no surgical intervention as per ortho  - NWB RLE    2 HLD  - cw stain    3 Hypothyroid  - cw synthroid    Lovenox for DVT prophylaxis    PT and rehab planning     dw pt

## 2022-02-14 LAB
ALBUMIN SERPL ELPH-MCNC: 3.2 G/DL — LOW (ref 3.3–5)
ALP SERPL-CCNC: 65 U/L — SIGNIFICANT CHANGE UP (ref 40–120)
ALT FLD-CCNC: 26 U/L — SIGNIFICANT CHANGE UP (ref 12–78)
ANION GAP SERPL CALC-SCNC: 3 MMOL/L — LOW (ref 5–17)
AST SERPL-CCNC: 16 U/L — SIGNIFICANT CHANGE UP (ref 15–37)
BILIRUB SERPL-MCNC: 0.6 MG/DL — SIGNIFICANT CHANGE UP (ref 0.2–1.2)
BUN SERPL-MCNC: 12 MG/DL — SIGNIFICANT CHANGE UP (ref 7–23)
CALCIUM SERPL-MCNC: 8.7 MG/DL — SIGNIFICANT CHANGE UP (ref 8.5–10.1)
CHLORIDE SERPL-SCNC: 109 MMOL/L — HIGH (ref 96–108)
CO2 SERPL-SCNC: 29 MMOL/L — SIGNIFICANT CHANGE UP (ref 22–31)
CREAT SERPL-MCNC: 0.6 MG/DL — SIGNIFICANT CHANGE UP (ref 0.5–1.3)
GLUCOSE SERPL-MCNC: 117 MG/DL — HIGH (ref 70–99)
HCT VFR BLD CALC: 42.6 % — SIGNIFICANT CHANGE UP (ref 34.5–45)
HGB BLD-MCNC: 14.6 G/DL — SIGNIFICANT CHANGE UP (ref 11.5–15.5)
MCHC RBC-ENTMCNC: 29.9 PG — SIGNIFICANT CHANGE UP (ref 27–34)
MCHC RBC-ENTMCNC: 34.3 GM/DL — SIGNIFICANT CHANGE UP (ref 32–36)
MCV RBC AUTO: 87.1 FL — SIGNIFICANT CHANGE UP (ref 80–100)
NRBC # BLD: 0 /100 WBCS — SIGNIFICANT CHANGE UP (ref 0–0)
PLATELET # BLD AUTO: 166 K/UL — SIGNIFICANT CHANGE UP (ref 150–400)
POTASSIUM SERPL-MCNC: 4.4 MMOL/L — SIGNIFICANT CHANGE UP (ref 3.5–5.3)
POTASSIUM SERPL-SCNC: 4.4 MMOL/L — SIGNIFICANT CHANGE UP (ref 3.5–5.3)
PROT SERPL-MCNC: 7 G/DL — SIGNIFICANT CHANGE UP (ref 6–8.3)
RBC # BLD: 4.89 M/UL — SIGNIFICANT CHANGE UP (ref 3.8–5.2)
RBC # FLD: 12.3 % — SIGNIFICANT CHANGE UP (ref 10.3–14.5)
SODIUM SERPL-SCNC: 141 MMOL/L — SIGNIFICANT CHANGE UP (ref 135–145)
WBC # BLD: 7.25 K/UL — SIGNIFICANT CHANGE UP (ref 3.8–10.5)
WBC # FLD AUTO: 7.25 K/UL — SIGNIFICANT CHANGE UP (ref 3.8–10.5)

## 2022-02-14 RX ADMIN — ENOXAPARIN SODIUM 40 MILLIGRAM(S): 100 INJECTION SUBCUTANEOUS at 11:46

## 2022-02-14 RX ADMIN — Medication 650 MILLIGRAM(S): at 07:00

## 2022-02-14 RX ADMIN — OXYCODONE AND ACETAMINOPHEN 1 TABLET(S): 5; 325 TABLET ORAL at 22:25

## 2022-02-14 RX ADMIN — Medication 650 MILLIGRAM(S): at 05:35

## 2022-02-14 RX ADMIN — ATORVASTATIN CALCIUM 80 MILLIGRAM(S): 80 TABLET, FILM COATED ORAL at 22:23

## 2022-02-14 RX ADMIN — EZETIMIBE 10 MILLIGRAM(S): 10 TABLET ORAL at 22:23

## 2022-02-14 RX ADMIN — Medication 650 MILLIGRAM(S): at 12:46

## 2022-02-14 RX ADMIN — OXYCODONE AND ACETAMINOPHEN 1 TABLET(S): 5; 325 TABLET ORAL at 20:19

## 2022-02-14 RX ADMIN — SENNA PLUS 2 TABLET(S): 8.6 TABLET ORAL at 22:23

## 2022-02-14 RX ADMIN — Medication 100 MICROGRAM(S): at 05:28

## 2022-02-14 RX ADMIN — POLYETHYLENE GLYCOL 3350 17 GRAM(S): 17 POWDER, FOR SOLUTION ORAL at 05:28

## 2022-02-14 RX ADMIN — Medication 650 MILLIGRAM(S): at 11:46

## 2022-02-14 NOTE — PHYSICAL THERAPY INITIAL EVALUATION ADULT - ADDITIONAL COMMENTS
Patient reports that she lives with her spouse in a private house with 3 NAGA, bedroom on 2nd floor Patient reports that she lives with her spouse in a private house with 3 NAGA, bedroom on 2nd floor. Able to stay on main floor if needed. Independent with ADLs and ambulation at baseline.

## 2022-02-14 NOTE — PHYSICAL THERAPY INITIAL EVALUATION ADULT - ACTIVE RANGE OF MOTION EXAMINATION, REHAB EVAL
(R) LE limited by pain/bilateral upper extremity Active ROM was WFL (within functional limits)/bilateral  lower extremity Active ROM was WFL (within functional limits)

## 2022-02-14 NOTE — CHART NOTE - NSCHARTNOTEFT_GEN_A_CORE
Called by RN, patient has not urinated overnight. Bladder scan showed 404cc. Will order straight cath.

## 2022-02-14 NOTE — PROGRESS NOTE ADULT - ASSESSMENT
72 yo female with pmhx of hld and hypothyroidism here for right hip/groin pain s/p fall. She was on a playground with her grandson and was running after him. She reports that her foot hit the ledge she lost her balance and fell approx 18 inches. She reports that she landed on her right side. Denies striking her head and denies LOC. Denies neck pain, N/V, CP, SOB, abd pain. Denies numbness or tingling in ext. Denies back pain. Pt does report gluteal pain and right hip. Pt was unable to get up on her own.  Pt is not on AC.    1 Hip fracture  - ortho fu  - pain control  - no surgical intervention as per ortho  - NWB RLE    2 HLD  - cw stain    3 Hypothyroid  - cw synthroid    Lovenox for DVT prophylaxis    PT and rehab planning     PT and dc planning to [facility]

## 2022-02-14 NOTE — PHYSICAL THERAPY INITIAL EVALUATION ADULT - PERTINENT HX OF CURRENT PROBLEM, REHAB EVAL
72 yo female with pmhx of hld and hypothyroidism here for right hip/groin pain s/p fall. Found to have pelvis fx (LC1 type injury w/ assoc nondisplaced right ant/post wall fracture). RAFAL (YINKA) OSIEL.

## 2022-02-14 NOTE — PROGRESS NOTE ADULT - SUBJECTIVE AND OBJECTIVE BOX
Patient is a 71y old  Female who presents with a chief complaint of sp fall (13 Feb 2022 06:36)    Date of servie : 02-14-22 @ 12:18  INTERVAL HPI/OVERNIGHT EVENTS:  T(C): 36.6 (02-14-22 @ 05:23), Max: 37 (02-13-22 @ 20:09)  HR: 65 (02-14-22 @ 05:23) (65 - 79)  BP: 144/82 (02-14-22 @ 05:23) (144/82 - 159/90)  RR: 18 (02-14-22 @ 05:23) (16 - 18)  SpO2: 94% (02-14-22 @ 05:23) (92% - 94%)  Wt(kg): --  I&O's Summary    13 Feb 2022 07:01  -  14 Feb 2022 07:00  --------------------------------------------------------  IN: 720 mL / OUT: 400 mL / NET: 320 mL    14 Feb 2022 07:01  -  14 Feb 2022 12:18  --------------------------------------------------------  IN: 0 mL / OUT: 500 mL / NET: -500 mL        LABS:                        14.6   7.25  )-----------( 166      ( 14 Feb 2022 06:49 )             42.6     02-14    141  |  109<H>  |  12  ----------------------------<  117<H>  4.4   |  29  |  0.60    Ca    8.7      14 Feb 2022 06:49    TPro  7.0  /  Alb  3.2<L>  /  TBili  0.6  /  DBili  x   /  AST  16  /  ALT  26  /  AlkPhos  65  02-14        CAPILLARY BLOOD GLUCOSE                MEDICATIONS  (STANDING):  atorvastatin 80 milliGRAM(s) Oral at bedtime  enoxaparin Injectable 40 milliGRAM(s) SubCutaneous daily  ezetimibe 10 milliGRAM(s) Oral daily  influenza  Vaccine (HIGH DOSE) 0.7 milliLiter(s) IntraMuscular once  levothyroxine 100 MICROGram(s) Oral daily  polyethylene glycol 3350 17 Gram(s) Oral daily  senna 2 Tablet(s) Oral at bedtime    MEDICATIONS  (PRN):  acetaminophen     Tablet .. 650 milliGRAM(s) Oral every 6 hours PRN Temp greater or equal to 38C (100.4F), Mild Pain (1 - 3)  morphine  - Injectable 2 milliGRAM(s) IV Push every 3 hours PRN Severe Pain (7 - 10)  oxycodone    5 mG/acetaminophen 325 mG 1 Tablet(s) Oral every 6 hours PRN Moderate Pain (4 - 6)          PHYSICAL EXAM:  GENERAL: NAD, well-groomed, well-developed  HEAD:  Atraumatic, Normocephalic  CHEST/LUNG: Clear to percussion bilaterally; No rales, rhonchi, wheezing, or rubs  HEART: Regular rate and rhythm; No murmurs, rubs, or gallops  ABDOMEN: Soft, Nontender, Nondistended; Bowel sounds present  EXTREMITIES:  2+ Peripheral Pulses, No clubbing, cyanosis, or edema  LYMPH: No lymphadenopathy noted  SKIN: No rashes or lesions    Care Discussed with Consultants/Other Providers [ ] YES  [ ] NO

## 2022-02-15 ENCOUNTER — TRANSCRIPTION ENCOUNTER (OUTPATIENT)
Age: 72
End: 2022-02-15

## 2022-02-15 LAB
ALBUMIN SERPL ELPH-MCNC: 3.1 G/DL — LOW (ref 3.3–5)
ALP SERPL-CCNC: 70 U/L — SIGNIFICANT CHANGE UP (ref 40–120)
ALT FLD-CCNC: 28 U/L — SIGNIFICANT CHANGE UP (ref 12–78)
ANION GAP SERPL CALC-SCNC: 3 MMOL/L — LOW (ref 5–17)
AST SERPL-CCNC: 22 U/L — SIGNIFICANT CHANGE UP (ref 15–37)
BILIRUB SERPL-MCNC: 0.5 MG/DL — SIGNIFICANT CHANGE UP (ref 0.2–1.2)
BUN SERPL-MCNC: 13 MG/DL — SIGNIFICANT CHANGE UP (ref 7–23)
CALCIUM SERPL-MCNC: 8.7 MG/DL — SIGNIFICANT CHANGE UP (ref 8.5–10.1)
CHLORIDE SERPL-SCNC: 111 MMOL/L — HIGH (ref 96–108)
CO2 SERPL-SCNC: 29 MMOL/L — SIGNIFICANT CHANGE UP (ref 22–31)
CREAT SERPL-MCNC: 0.6 MG/DL — SIGNIFICANT CHANGE UP (ref 0.5–1.3)
GLUCOSE SERPL-MCNC: 105 MG/DL — HIGH (ref 70–99)
HCT VFR BLD CALC: 41 % — SIGNIFICANT CHANGE UP (ref 34.5–45)
HGB BLD-MCNC: 13.8 G/DL — SIGNIFICANT CHANGE UP (ref 11.5–15.5)
MCHC RBC-ENTMCNC: 29.9 PG — SIGNIFICANT CHANGE UP (ref 27–34)
MCHC RBC-ENTMCNC: 33.7 GM/DL — SIGNIFICANT CHANGE UP (ref 32–36)
MCV RBC AUTO: 88.7 FL — SIGNIFICANT CHANGE UP (ref 80–100)
NRBC # BLD: 0 /100 WBCS — SIGNIFICANT CHANGE UP (ref 0–0)
PLATELET # BLD AUTO: 182 K/UL — SIGNIFICANT CHANGE UP (ref 150–400)
POTASSIUM SERPL-MCNC: 4.3 MMOL/L — SIGNIFICANT CHANGE UP (ref 3.5–5.3)
POTASSIUM SERPL-SCNC: 4.3 MMOL/L — SIGNIFICANT CHANGE UP (ref 3.5–5.3)
PROT SERPL-MCNC: 6.7 G/DL — SIGNIFICANT CHANGE UP (ref 6–8.3)
RBC # BLD: 4.62 M/UL — SIGNIFICANT CHANGE UP (ref 3.8–5.2)
RBC # FLD: 12.3 % — SIGNIFICANT CHANGE UP (ref 10.3–14.5)
SODIUM SERPL-SCNC: 143 MMOL/L — SIGNIFICANT CHANGE UP (ref 135–145)
WBC # BLD: 6.78 K/UL — SIGNIFICANT CHANGE UP (ref 3.8–10.5)
WBC # FLD AUTO: 6.78 K/UL — SIGNIFICANT CHANGE UP (ref 3.8–10.5)

## 2022-02-15 RX ORDER — SENNA PLUS 8.6 MG/1
2 TABLET ORAL
Qty: 0 | Refills: 0 | DISCHARGE
Start: 2022-02-15

## 2022-02-15 RX ADMIN — Medication 650 MILLIGRAM(S): at 10:21

## 2022-02-15 RX ADMIN — SENNA PLUS 2 TABLET(S): 8.6 TABLET ORAL at 22:11

## 2022-02-15 RX ADMIN — OXYCODONE AND ACETAMINOPHEN 1 TABLET(S): 5; 325 TABLET ORAL at 22:11

## 2022-02-15 RX ADMIN — POLYETHYLENE GLYCOL 3350 17 GRAM(S): 17 POWDER, FOR SOLUTION ORAL at 10:21

## 2022-02-15 RX ADMIN — EZETIMIBE 10 MILLIGRAM(S): 10 TABLET ORAL at 10:21

## 2022-02-15 RX ADMIN — OXYCODONE AND ACETAMINOPHEN 1 TABLET(S): 5; 325 TABLET ORAL at 23:11

## 2022-02-15 RX ADMIN — ENOXAPARIN SODIUM 40 MILLIGRAM(S): 100 INJECTION SUBCUTANEOUS at 10:21

## 2022-02-15 RX ADMIN — ATORVASTATIN CALCIUM 80 MILLIGRAM(S): 80 TABLET, FILM COATED ORAL at 22:11

## 2022-02-15 RX ADMIN — Medication 100 MICROGRAM(S): at 05:59

## 2022-02-15 RX ADMIN — Medication 650 MILLIGRAM(S): at 10:50

## 2022-02-15 NOTE — DISCHARGE NOTE PROVIDER - HOSPITAL COURSE
70 yo female with pmhx of hld and hypothyroidism here for right hip/groin pain s/p fall. She was on a playground with her grandson and was running after him. She reports that her foot hit the ledge she lost her balance and fell approx 18 inches. She reports that she landed on her right side. Denies striking her head and denies LOC. Denies neck pain, N/V, CP, SOB, abd pain. Denies numbness or tingling in ext. Denies back pain. Pt does report gluteal pain and right hip. Pt was unable to get up on her own.  Pt is not on AC.    1 Hip fracture  - ortho fu  - pain control  - no surgical intervention as per ortho  - NWB RLE    2 HLD  - cw stain    3 Hypothyroid  - cw synthroid    Lovenox for DVT prophylaxis    PT and rehab planning     PT and dc planning to [facility]      72 yo female with pmhx of hld and hypothyroidism here for right hip/groin pain s/p fall. She was on a playground with her grandson and was running after him. She reports that her foot hit the ledge she lost her balance and fell approx 18 inches. She reports that she landed on her right side. Denies striking her head and denies LOC. Denies neck pain, N/V, CP, SOB, abd pain. Denies numbness or tingling in ext. Denies back pain. Pt does report gluteal pain and right hip. Pt was unable to get up on her own.  Pt is not on AC.    1Fractures are noted in the right sacrum, right acetabulum,   right superior and inferior pubic rami and right pubic symphysis.  - ortho fu  - pain control  - no surgical intervention as per ortho  - NWB RLE    2 HLD  - cw stain    3 Hypothyroid  - cw synthroid    Lovenox for DVT prophylaxis    PT and rehab planning     PT and dc planning to [facility]

## 2022-02-15 NOTE — DISCHARGE NOTE PROVIDER - DATE OF DISCHARGE SERVICE:
Pt presents to ED with c/o SOB x4 days that has progressively gotten worse and edema to right foot and ankle since last pm. Pt uses home oxygen @2L per NC. Pt also presents with hemodialysis fistula that has been recently placed and pt has not started dialysis at this time. Pt confirms producing urine and hx CHF and COPD.    APPEARANCE: Alert, oriented and in no acute distress.  CARDIAC: Normal rate and rhythm, no murmur heard.   PERIPHERAL VASCULAR: peripheral pulses present. Normal cap refill; Trace edema right foot / ankle. Warm to touch.    RESPIRATORY:Diminished breath sounds and expiratory wheezing; SOB with exertion; O2@2L/ min per NC  GASTRO: soft, bowel sounds normal, no tenderness, no abdominal distention.  MUSC: Full ROM. No bony tenderness or soft tissue tenderness. No obvious deformity.  SKIN: Skin is warm and dry, normal skin turgor, mucous membranes moist; hemodialysis fistula left upper arm + bruit, +thrill  NEURO: 5/5 strength major flexors/extensors bilaterally. Sensory intact to light touch bilaterally. Shant coma scale: eyes open spontaneously-4, oriented & converses-5, obeys commands-6. No neurological abnormalities.   MENTAL STATUS: awake, alert and aware of environment.       15-Feb-2022 12:33

## 2022-02-15 NOTE — DISCHARGE NOTE PROVIDER - NSDCCPCAREPLAN_GEN_ALL_CORE_FT
PRINCIPAL DISCHARGE DIAGNOSIS  Diagnosis: Pelvic fracture  Assessment and Plan of Treatment:        PRINCIPAL DISCHARGE DIAGNOSIS  Diagnosis: Pelvic fracture  Assessment and Plan of Treatment: Fractures are noted in the right sacrum, right acetabulum,   right superior and inferior pubic rami and right pubic symphysis.

## 2022-02-15 NOTE — DISCHARGE NOTE PROVIDER - CARE PROVIDER_API CALL
Bolivar Kumari)  Orthopaedic Surgery; Sports Medicine  [facility], [address] [place] [address]  Phone: [phone]  Fax: [phone]  Follow Up Time:

## 2022-02-15 NOTE — DISCHARGE NOTE PROVIDER - NSDCMRMEDTOKEN_GEN_ALL_CORE_FT
oxycodone-acetaminophen 5 mg-325 mg oral tablet: 1 tab(s) orally every 6 hours, As needed, Moderate Pain (4 - 6)  rosuvastatin 20 mg oral tablet: 1 tab(s) orally once a day  senna oral tablet: 2 tab(s) orally once a day (at bedtime)  Synthroid 100 mcg (0.1 mg) oral tablet: 1 tab(s) orally once a day  Zetia 10 mg oral tablet: 1 tab(s) orally once a day

## 2022-02-16 LAB — SARS-COV-2 RNA SPEC QL NAA+PROBE: SIGNIFICANT CHANGE UP

## 2022-02-16 RX ADMIN — POLYETHYLENE GLYCOL 3350 17 GRAM(S): 17 POWDER, FOR SOLUTION ORAL at 09:24

## 2022-02-16 RX ADMIN — ATORVASTATIN CALCIUM 80 MILLIGRAM(S): 80 TABLET, FILM COATED ORAL at 22:05

## 2022-02-16 RX ADMIN — ENOXAPARIN SODIUM 40 MILLIGRAM(S): 100 INJECTION SUBCUTANEOUS at 11:52

## 2022-02-16 RX ADMIN — EZETIMIBE 10 MILLIGRAM(S): 10 TABLET ORAL at 11:52

## 2022-02-16 RX ADMIN — Medication 100 MICROGRAM(S): at 06:24

## 2022-02-16 RX ADMIN — SENNA PLUS 2 TABLET(S): 8.6 TABLET ORAL at 22:04

## 2022-02-16 RX ADMIN — Medication 650 MILLIGRAM(S): at 10:42

## 2022-02-16 RX ADMIN — OXYCODONE AND ACETAMINOPHEN 1 TABLET(S): 5; 325 TABLET ORAL at 22:05

## 2022-02-16 RX ADMIN — Medication 650 MILLIGRAM(S): at 09:24

## 2022-02-16 RX ADMIN — Medication 650 MILLIGRAM(S): at 16:12

## 2022-02-16 RX ADMIN — Medication 650 MILLIGRAM(S): at 17:43

## 2022-02-16 NOTE — PROGRESS NOTE ADULT - ASSESSMENT
70 yo female with pmhx of hld and hypothyroidism here for right hip/groin pain s/p fall. She was on a playground with her grandson and was running after him. She reports that her foot hit the ledge she lost her balance and fell approx 18 inches. She reports that she landed on her right side. Denies striking her head and denies LOC. Denies neck pain, N/V, CP, SOB, abd pain. Denies numbness or tingling in ext. Denies back pain. Pt does report gluteal pain and right hip. Pt was unable to get up on her own.  Pt is not on AC.    1 Hip fracture  - ortho fu  - pain control  - no surgical intervention as per ortho  - NWB RLE    2 HLD  - cw stain    3 Hypothyroid  - cw synthroid    Lovenox for DVT prophylaxis    PT and rehab planning     PT and dc planning to [facility]      72 yo female with pmhx of hld and hypothyroidism here for right hip/groin pain s/p fall. She was on a playground with her grandson and was running after him. She reports that her foot hit the ledge she lost her balance and fell approx 18 inches. She reports that she landed on her right side. Denies striking her head and denies LOC. Denies neck pain, N/V, CP, SOB, abd pain. Denies numbness or tingling in ext. Denies back pain. Pt does report gluteal pain and right hip. Pt was unable to get up on her own.  Pt is not on AC.    1 Fractures are noted in the right sacrum, right acetabulum,   right superior and inferior pubic rami and right pubic symphysis.  - ortho fu  - pain control  - no surgical intervention as per ortho  - NWB RLE    2 HLD  - cw stain    3 Hypothyroid  - cw synthroid    Lovenox for DVT prophylaxis    PT and rehab planning     PT and dc planning to [facility]

## 2022-02-16 NOTE — PROGRESS NOTE ADULT - SUBJECTIVE AND OBJECTIVE BOX
Patient is a 71y old  Female who presents with a chief complaint of sp fall (15 Feb 2022 12:28)    Date of servie : 02-16-22 @ 13:24  INTERVAL HPI/OVERNIGHT EVENTS:  T(C): 36.7 (02-16-22 @ 05:42), Max: 36.9 (02-15-22 @ 21:50)  HR: 60 (02-16-22 @ 05:42) (60 - 84)  BP: 152/87 (02-16-22 @ 05:42) (136/88 - 153/88)  RR: 18 (02-16-22 @ 05:42) (17 - 20)  SpO2: 97% (02-16-22 @ 05:42) (94% - 99%)  Wt(kg): --  I&O's Summary    15 Feb 2022 07:01  -  16 Feb 2022 07:00  --------------------------------------------------------  IN: 0 mL / OUT: 200 mL / NET: -200 mL        LABS:                        13.8   6.78  )-----------( 182      ( 15 Feb 2022 05:38 )             41.0     02-15    143  |  111<H>  |  13  ----------------------------<  105<H>  4.3   |  29  |  0.60    Ca    8.7      15 Feb 2022 05:38    TPro  6.7  /  Alb  3.1<L>  /  TBili  0.5  /  DBili  x   /  AST  22  /  ALT  28  /  AlkPhos  70  02-15        CAPILLARY BLOOD GLUCOSE                MEDICATIONS  (STANDING):  atorvastatin 80 milliGRAM(s) Oral at bedtime  enoxaparin Injectable 40 milliGRAM(s) SubCutaneous daily  ezetimibe 10 milliGRAM(s) Oral daily  influenza  Vaccine (HIGH DOSE) 0.7 milliLiter(s) IntraMuscular once  levothyroxine 100 MICROGram(s) Oral daily  polyethylene glycol 3350 17 Gram(s) Oral daily  senna 2 Tablet(s) Oral at bedtime    MEDICATIONS  (PRN):  acetaminophen     Tablet .. 650 milliGRAM(s) Oral every 6 hours PRN Temp greater or equal to 38C (100.4F), Mild Pain (1 - 3)  morphine  - Injectable 2 milliGRAM(s) IV Push every 3 hours PRN Severe Pain (7 - 10)  oxycodone    5 mG/acetaminophen 325 mG 1 Tablet(s) Oral every 6 hours PRN Moderate Pain (4 - 6)          PHYSICAL EXAM:  GENERAL: NAD, well-groomed, well-developed  HEAD:  Atraumatic, Normocephalic  CHEST/LUNG: Clear to percussion bilaterally; No rales, rhonchi, wheezing, or rubs  HEART: Regular rate and rhythm; No murmurs, rubs, or gallops  ABDOMEN: Soft, Nontender, Nondistended; Bowel sounds present  EXTREMITIES:  2+ Peripheral Pulses, No clubbing, cyanosis, or edema  LYMPH: No lymphadenopathy noted  SKIN: No rashes or lesions    Care Discussed with Consultants/Other Providers [x ] YES  [ ] NO

## 2022-02-17 RX ADMIN — Medication 650 MILLIGRAM(S): at 12:42

## 2022-02-17 RX ADMIN — ATORVASTATIN CALCIUM 80 MILLIGRAM(S): 80 TABLET, FILM COATED ORAL at 22:10

## 2022-02-17 RX ADMIN — EZETIMIBE 10 MILLIGRAM(S): 10 TABLET ORAL at 12:42

## 2022-02-17 RX ADMIN — Medication 650 MILLIGRAM(S): at 05:11

## 2022-02-17 RX ADMIN — OXYCODONE AND ACETAMINOPHEN 1 TABLET(S): 5; 325 TABLET ORAL at 07:50

## 2022-02-17 RX ADMIN — ENOXAPARIN SODIUM 40 MILLIGRAM(S): 100 INJECTION SUBCUTANEOUS at 12:42

## 2022-02-17 RX ADMIN — Medication 100 MICROGRAM(S): at 05:11

## 2022-02-17 RX ADMIN — OXYCODONE AND ACETAMINOPHEN 1 TABLET(S): 5; 325 TABLET ORAL at 22:09

## 2022-02-17 RX ADMIN — OXYCODONE AND ACETAMINOPHEN 1 TABLET(S): 5; 325 TABLET ORAL at 23:00

## 2022-02-17 RX ADMIN — Medication 650 MILLIGRAM(S): at 06:11

## 2022-02-17 RX ADMIN — POLYETHYLENE GLYCOL 3350 17 GRAM(S): 17 POWDER, FOR SOLUTION ORAL at 12:42

## 2022-02-17 RX ADMIN — SENNA PLUS 2 TABLET(S): 8.6 TABLET ORAL at 22:09

## 2022-02-17 NOTE — PROGRESS NOTE ADULT - SUBJECTIVE AND OBJECTIVE BOX
Patient is a 71y old  Female who presents with a chief complaint of sp fall (16 Feb 2022 13:23)    Date of servie : 02-17-22 @ 12:05  INTERVAL HPI/OVERNIGHT EVENTS:  T(C): 36.5 (02-17-22 @ 05:16), Max: 36.8 (02-16-22 @ 14:38)  HR: 64 (02-17-22 @ 05:16) (64 - 71)  BP: 146/89 (02-17-22 @ 05:16) (134/75 - 147/84)  RR: 20 (02-17-22 @ 05:16) (18 - 20)  SpO2: 92% (02-17-22 @ 05:16) (92% - 95%)  Wt(kg): --  I&O's Summary    16 Feb 2022 07:01  -  17 Feb 2022 07:00  --------------------------------------------------------  IN: 0 mL / OUT: 200 mL / NET: -200 mL        LABS:              CAPILLARY BLOOD GLUCOSE                MEDICATIONS  (STANDING):  atorvastatin 80 milliGRAM(s) Oral at bedtime  enoxaparin Injectable 40 milliGRAM(s) SubCutaneous daily  ezetimibe 10 milliGRAM(s) Oral daily  influenza  Vaccine (HIGH DOSE) 0.7 milliLiter(s) IntraMuscular once  levothyroxine 100 MICROGram(s) Oral daily  polyethylene glycol 3350 17 Gram(s) Oral daily  senna 2 Tablet(s) Oral at bedtime    MEDICATIONS  (PRN):  acetaminophen     Tablet .. 650 milliGRAM(s) Oral every 6 hours PRN Temp greater or equal to 38C (100.4F), Mild Pain (1 - 3)  morphine  - Injectable 2 milliGRAM(s) IV Push every 3 hours PRN Severe Pain (7 - 10)  oxycodone    5 mG/acetaminophen 325 mG 1 Tablet(s) Oral every 6 hours PRN Moderate Pain (4 - 6)          PHYSICAL EXAM:  GENERAL: NAD, well-groomed, well-developed  HEAD:  Atraumatic, Normocephalic  CHEST/LUNG: Clear to percussion bilaterally; No rales, rhonchi, wheezing, or rubs  HEART: Regular rate and rhythm; No murmurs, rubs, or gallops  ABDOMEN: Soft, Nontender, Nondistended; Bowel sounds present  EXTREMITIES:  2+ Peripheral Pulses, No clubbing, cyanosis, or edema  LYMPH: No lymphadenopathy noted  SKIN: No rashes or lesions    Care Discussed with Consultants/Other Providers [ xx] YES  [ ] NO

## 2022-02-17 NOTE — PROGRESS NOTE ADULT - ASSESSMENT
72 yo female with pmhx of hld and hypothyroidism here for right hip/groin pain s/p fall. She was on a playground with her grandson and was running after him. She reports that her foot hit the ledge she lost her balance and fell approx 18 inches. She reports that she landed on her right side. Denies striking her head and denies LOC. Denies neck pain, N/V, CP, SOB, abd pain. Denies numbness or tingling in ext. Denies back pain. Pt does report gluteal pain and right hip. Pt was unable to get up on her own.  Pt is not on AC.    1 Fractures are noted in the right sacrum, right acetabulum,   right superior and inferior pubic rami and right pubic symphysis.  - ortho fu  - pain control  - no surgical intervention as per ortho  - NWB RLE    2 HLD  - cw stain    3 Hypothyroid  - cw synthroid    Lovenox for DVT prophylaxis    PT and rehab planning     PT and dc planning to [facility]

## 2022-02-18 RX ADMIN — ENOXAPARIN SODIUM 40 MILLIGRAM(S): 100 INJECTION SUBCUTANEOUS at 12:40

## 2022-02-18 RX ADMIN — Medication 650 MILLIGRAM(S): at 08:23

## 2022-02-18 RX ADMIN — Medication 650 MILLIGRAM(S): at 07:26

## 2022-02-18 RX ADMIN — EZETIMIBE 10 MILLIGRAM(S): 10 TABLET ORAL at 12:40

## 2022-02-18 RX ADMIN — OXYCODONE AND ACETAMINOPHEN 1 TABLET(S): 5; 325 TABLET ORAL at 21:14

## 2022-02-18 RX ADMIN — ATORVASTATIN CALCIUM 80 MILLIGRAM(S): 80 TABLET, FILM COATED ORAL at 21:15

## 2022-02-18 NOTE — PROGRESS NOTE ADULT - ASSESSMENT
70 yo female with pmhx of hld and hypothyroidism here for right hip/groin pain s/p fall. She was on a playground with her grandson and was running after him. She reports that her foot hit the ledge she lost her balance and fell approx 18 inches. She reports that she landed on her right side. Denies striking her head and denies LOC. Denies neck pain, N/V, CP, SOB, abd pain. Denies numbness or tingling in ext. Denies back pain. Pt does report gluteal pain and right hip. Pt was unable to get up on her own.  Pt is not on AC.    1 Fractures are noted in the right sacrum, right acetabulum,   right superior and inferior pubic rami and right pubic symphysis.  - ortho fu  - pain control  - no surgical intervention as per ortho  - NWB RLE    2 HLD  - cw stain    3 Hypothyroid  - cw synthroid    Lovenox for DVT prophylaxis    PT and rehab planning     PT and dc planning to [facility]

## 2022-02-18 NOTE — DIETITIAN INITIAL EVALUATION ADULT. - OTHER INFO
72 yo female with pmhx of hld and hypothyroidism here for right hip/groin pain s/p fall. Found to have fx of the right sacrum, right acetabulum, right superior and inferior pubic rami and right pubic symphysis. No surgical intervention.

## 2022-02-18 NOTE — DIETITIAN INITIAL EVALUATION ADULT. - ORAL INTAKE PTA/DIET HISTORY
Pt follows no special diet at home, denies wt loss, NKFA, no problems chewing or swallowing.  Eating well, no complaints offered.

## 2022-02-18 NOTE — PROGRESS NOTE ADULT - SUBJECTIVE AND OBJECTIVE BOX
Patient is a 71y old  Female who presents with a chief complaint of sp fall (18 Feb 2022 10:43)    Date of servie : 02-18-22 @ 14:29  INTERVAL HPI/OVERNIGHT EVENTS:  T(C): 36.7 (02-18-22 @ 12:31), Max: 36.7 (02-17-22 @ 21:13)  HR: 80 (02-18-22 @ 12:31) (58 - 80)  BP: 146/93 (02-18-22 @ 12:31) (146/87 - 147/86)  RR: 17 (02-18-22 @ 12:31) (17 - 18)  SpO2: 96% (02-18-22 @ 12:31) (95% - 97%)  Wt(kg): --  I&O's Summary    17 Feb 2022 07:01  -  18 Feb 2022 07:00  --------------------------------------------------------  IN: 0 mL / OUT: 400 mL / NET: -400 mL        LABS:              CAPILLARY BLOOD GLUCOSE                MEDICATIONS  (STANDING):  atorvastatin 80 milliGRAM(s) Oral at bedtime  enoxaparin Injectable 40 milliGRAM(s) SubCutaneous daily  ezetimibe 10 milliGRAM(s) Oral daily  influenza  Vaccine (HIGH DOSE) 0.7 milliLiter(s) IntraMuscular once  levothyroxine 100 MICROGram(s) Oral daily  polyethylene glycol 3350 17 Gram(s) Oral daily  senna 2 Tablet(s) Oral at bedtime    MEDICATIONS  (PRN):  acetaminophen     Tablet .. 650 milliGRAM(s) Oral every 6 hours PRN Temp greater or equal to 38C (100.4F), Mild Pain (1 - 3)  morphine  - Injectable 2 milliGRAM(s) IV Push every 3 hours PRN Severe Pain (7 - 10)  oxycodone    5 mG/acetaminophen 325 mG 1 Tablet(s) Oral every 6 hours PRN Moderate Pain (4 - 6)           Patient is a 71y old  Female who presents with a chief complaint of sp fall (18 Feb 2022 10:43)    Date of servie : 02-18-22 @ 14:29  INTERVAL HPI/OVERNIGHT EVENTS:  T(C): 36.7 (02-18-22 @ 12:31), Max: 36.7 (02-17-22 @ 21:13)  HR: 80 (02-18-22 @ 12:31) (58 - 80)  BP: 146/93 (02-18-22 @ 12:31) (146/87 - 147/86)  RR: 17 (02-18-22 @ 12:31) (17 - 18)  SpO2: 96% (02-18-22 @ 12:31) (95% - 97%)  Wt(kg): --  I&O's Summary    17 Feb 2022 07:01  -  18 Feb 2022 07:00  --------------------------------------------------------  IN: 0 mL / OUT: 400 mL / NET: -400 mL        LABS:              CAPILLARY BLOOD GLUCOSE                MEDICATIONS  (STANDING):  atorvastatin 80 milliGRAM(s) Oral at bedtime  enoxaparin Injectable 40 milliGRAM(s) SubCutaneous daily  ezetimibe 10 milliGRAM(s) Oral daily  influenza  Vaccine (HIGH DOSE) 0.7 milliLiter(s) IntraMuscular once  levothyroxine 100 MICROGram(s) Oral daily  polyethylene glycol 3350 17 Gram(s) Oral daily  senna 2 Tablet(s) Oral at bedtime    MEDICATIONS  (PRN):  acetaminophen     Tablet .. 650 milliGRAM(s) Oral every 6 hours PRN Temp greater or equal to 38C (100.4F), Mild Pain (1 - 3)  morphine  - Injectable 2 milliGRAM(s) IV Push every 3 hours PRN Severe Pain (7 - 10)  oxycodone    5 mG/acetaminophen 325 mG 1 Tablet(s) Oral every 6 hours PRN Moderate Pain (4 - 6)        General: WN/WD NAD  PERRLA  Neurology: A&Ox3, nonfocal, ULLOA x 4  Respiratory: CTA B/L  CV: RRR, S1S2, no murmurs, rubs or gallops  Abdominal: Soft, NT, ND +BS, Last BM  Extremities: No edema, + peripheral pulses  Skin Normal

## 2022-02-19 RX ADMIN — Medication 650 MILLIGRAM(S): at 14:50

## 2022-02-19 RX ADMIN — Medication 650 MILLIGRAM(S): at 22:00

## 2022-02-19 RX ADMIN — ATORVASTATIN CALCIUM 80 MILLIGRAM(S): 80 TABLET, FILM COATED ORAL at 21:05

## 2022-02-19 RX ADMIN — ENOXAPARIN SODIUM 40 MILLIGRAM(S): 100 INJECTION SUBCUTANEOUS at 12:54

## 2022-02-19 RX ADMIN — SENNA PLUS 2 TABLET(S): 8.6 TABLET ORAL at 21:06

## 2022-02-19 RX ADMIN — Medication 650 MILLIGRAM(S): at 05:00

## 2022-02-19 RX ADMIN — Medication 650 MILLIGRAM(S): at 21:05

## 2022-02-19 RX ADMIN — Medication 650 MILLIGRAM(S): at 13:11

## 2022-02-19 RX ADMIN — Medication 100 MICROGRAM(S): at 05:00

## 2022-02-19 RX ADMIN — EZETIMIBE 10 MILLIGRAM(S): 10 TABLET ORAL at 12:55

## 2022-02-19 NOTE — PROGRESS NOTE ADULT - SUBJECTIVE AND OBJECTIVE BOX
Patient is a 71y old  Female who presents with a chief complaint of sp fall (18 Feb 2022 14:29)    Date of servie : 02-19-22 @ 07:03  INTERVAL HPI/OVERNIGHT EVENTS:  T(C): 36.6 (02-19-22 @ 05:01), Max: 36.7 (02-18-22 @ 12:31)  HR: 62 (02-19-22 @ 05:01) (62 - 80)  BP: 139/91 (02-19-22 @ 05:01) (139/91 - 146/93)  RR: 16 (02-19-22 @ 05:01) (16 - 17)  SpO2: 94% (02-19-22 @ 05:01) (94% - 96%)  Wt(kg): --  I&O's Summary      LABS:              CAPILLARY BLOOD GLUCOSE                MEDICATIONS  (STANDING):  atorvastatin 80 milliGRAM(s) Oral at bedtime  enoxaparin Injectable 40 milliGRAM(s) SubCutaneous daily  ezetimibe 10 milliGRAM(s) Oral daily  influenza  Vaccine (HIGH DOSE) 0.7 milliLiter(s) IntraMuscular once  levothyroxine 100 MICROGram(s) Oral daily  polyethylene glycol 3350 17 Gram(s) Oral daily  senna 2 Tablet(s) Oral at bedtime    MEDICATIONS  (PRN):  acetaminophen     Tablet .. 650 milliGRAM(s) Oral every 6 hours PRN Temp greater or equal to 38C (100.4F), Mild Pain (1 - 3)          PHYSICAL EXAM:  GENERAL: NAD, well-groomed, well-developed  HEAD:  Atraumatic, Normocephalic  CHEST/LUNG: Clear to percussion bilaterally; No rales, rhonchi, wheezing, or rubs  HEART: Regular rate and rhythm; No murmurs, rubs, or gallops  ABDOMEN: Soft, Nontender, Nondistended; Bowel sounds present  EXTREMITIES:  2+ Peripheral Pulses, No clubbing, cyanosis, or edema  LYMPH: No lymphadenopathy noted  SKIN: No rashes or lesions    Care Discussed with Consultants/Other Providers [ ] YES  [ ] NO

## 2022-02-20 RX ADMIN — SENNA PLUS 2 TABLET(S): 8.6 TABLET ORAL at 21:19

## 2022-02-20 RX ADMIN — Medication 650 MILLIGRAM(S): at 22:19

## 2022-02-20 RX ADMIN — ENOXAPARIN SODIUM 40 MILLIGRAM(S): 100 INJECTION SUBCUTANEOUS at 11:49

## 2022-02-20 RX ADMIN — Medication 650 MILLIGRAM(S): at 12:22

## 2022-02-20 RX ADMIN — Medication 100 MICROGRAM(S): at 05:20

## 2022-02-20 RX ADMIN — Medication 650 MILLIGRAM(S): at 05:19

## 2022-02-20 RX ADMIN — Medication 650 MILLIGRAM(S): at 06:15

## 2022-02-20 RX ADMIN — Medication 650 MILLIGRAM(S): at 13:22

## 2022-02-20 RX ADMIN — ATORVASTATIN CALCIUM 80 MILLIGRAM(S): 80 TABLET, FILM COATED ORAL at 21:47

## 2022-02-20 RX ADMIN — Medication 650 MILLIGRAM(S): at 21:19

## 2022-02-20 RX ADMIN — EZETIMIBE 10 MILLIGRAM(S): 10 TABLET ORAL at 21:19

## 2022-02-20 NOTE — PROGRESS NOTE ADULT - PROVIDER SPECIALTY LIST ADULT
Hospitalist
Orthopedics
Hospitalist

## 2022-02-20 NOTE — PROGRESS NOTE ADULT - SUBJECTIVE AND OBJECTIVE BOX
Patient is a 71y old  Female who presents with a chief complaint of sp fall (19 Feb 2022 07:03)    Date of servie : 02-20-22 @ 14:27  INTERVAL HPI/OVERNIGHT EVENTS:  T(C): 36.7 (02-20-22 @ 12:36), Max: 36.8 (02-19-22 @ 15:04)  HR: 67 (02-20-22 @ 12:36) (60 - 73)  BP: 158/90 (02-20-22 @ 12:36) (118/75 - 158/90)  RR: 17 (02-20-22 @ 12:36) (16 - 17)  SpO2: 96% (02-20-22 @ 12:36) (96% - 99%)  Wt(kg): --  I&O's Summary    19 Feb 2022 07:01  -  20 Feb 2022 07:00  --------------------------------------------------------  IN: 0 mL / OUT: 550 mL / NET: -550 mL        LABS:              CAPILLARY BLOOD GLUCOSE                MEDICATIONS  (STANDING):  atorvastatin 80 milliGRAM(s) Oral at bedtime  enoxaparin Injectable 40 milliGRAM(s) SubCutaneous daily  ezetimibe 10 milliGRAM(s) Oral daily  influenza  Vaccine (HIGH DOSE) 0.7 milliLiter(s) IntraMuscular once  levothyroxine 100 MICROGram(s) Oral daily  polyethylene glycol 3350 17 Gram(s) Oral daily  senna 2 Tablet(s) Oral at bedtime    MEDICATIONS  (PRN):  acetaminophen     Tablet .. 650 milliGRAM(s) Oral every 6 hours PRN Temp greater or equal to 38C (100.4F), Mild Pain (1 - 3)          PHYSICAL EXAM:  GENERAL: NAD, well-groomed, well-developed  HEAD:  Atraumatic, Normocephalic  CHEST/LUNG: Clear to percussion bilaterally; No rales, rhonchi, wheezing, or rubs  HEART: Regular rate and rhythm; No murmurs, rubs, or gallops  ABDOMEN: Soft, Nontender, Nondistended; Bowel sounds present  EXTREMITIES:  2+ Peripheral Pulses, No clubbing, cyanosis, or edema  LYMPH: No lymphadenopathy noted  SKIN: No rashes or lesions    Care Discussed with Consultants/Other Providers [x ] YES  [ ] NO

## 2022-02-21 ENCOUNTER — TRANSCRIPTION ENCOUNTER (OUTPATIENT)
Age: 72
End: 2022-02-21

## 2022-02-21 VITALS
OXYGEN SATURATION: 98 % | SYSTOLIC BLOOD PRESSURE: 163 MMHG | TEMPERATURE: 98 F | DIASTOLIC BLOOD PRESSURE: 78 MMHG | RESPIRATION RATE: 18 BRPM | HEART RATE: 77 BPM

## 2022-02-21 LAB — SARS-COV-2 RNA SPEC QL NAA+PROBE: SIGNIFICANT CHANGE UP

## 2022-02-21 PROCEDURE — 72190 X-RAY EXAM OF PELVIS: CPT

## 2022-02-21 PROCEDURE — U0005: CPT

## 2022-02-21 PROCEDURE — 86900 BLOOD TYPING SEROLOGIC ABO: CPT

## 2022-02-21 PROCEDURE — 76376 3D RENDER W/INTRP POSTPROCES: CPT

## 2022-02-21 PROCEDURE — 72192 CT PELVIS W/O DYE: CPT | Mod: MA

## 2022-02-21 PROCEDURE — U0003: CPT

## 2022-02-21 PROCEDURE — 86803 HEPATITIS C AB TEST: CPT

## 2022-02-21 PROCEDURE — 86901 BLOOD TYPING SEROLOGIC RH(D): CPT

## 2022-02-21 PROCEDURE — 96375 TX/PRO/DX INJ NEW DRUG ADDON: CPT

## 2022-02-21 PROCEDURE — 80053 COMPREHEN METABOLIC PANEL: CPT

## 2022-02-21 PROCEDURE — 93005 ELECTROCARDIOGRAM TRACING: CPT

## 2022-02-21 PROCEDURE — 85025 COMPLETE CBC W/AUTO DIFF WBC: CPT

## 2022-02-21 PROCEDURE — 99285 EMERGENCY DEPT VISIT HI MDM: CPT | Mod: 25

## 2022-02-21 PROCEDURE — 73552 X-RAY EXAM OF FEMUR 2/>: CPT

## 2022-02-21 PROCEDURE — 86850 RBC ANTIBODY SCREEN: CPT

## 2022-02-21 PROCEDURE — 73502 X-RAY EXAM HIP UNI 2-3 VIEWS: CPT

## 2022-02-21 PROCEDURE — 71101 X-RAY EXAM UNILAT RIBS/CHEST: CPT

## 2022-02-21 PROCEDURE — 85610 PROTHROMBIN TIME: CPT

## 2022-02-21 PROCEDURE — 85027 COMPLETE CBC AUTOMATED: CPT

## 2022-02-21 PROCEDURE — 96374 THER/PROPH/DIAG INJ IV PUSH: CPT

## 2022-02-21 PROCEDURE — 87635 SARS-COV-2 COVID-19 AMP PRB: CPT

## 2022-02-21 PROCEDURE — 36415 COLL VENOUS BLD VENIPUNCTURE: CPT

## 2022-02-21 PROCEDURE — 97161 PT EVAL LOW COMPLEX 20 MIN: CPT

## 2022-02-21 PROCEDURE — 97116 GAIT TRAINING THERAPY: CPT

## 2022-02-21 RX ADMIN — Medication 650 MILLIGRAM(S): at 14:11

## 2022-02-21 RX ADMIN — Medication 650 MILLIGRAM(S): at 06:08

## 2022-02-21 RX ADMIN — Medication 650 MILLIGRAM(S): at 05:08

## 2022-02-21 RX ADMIN — Medication 100 MICROGRAM(S): at 05:10

## 2022-02-21 RX ADMIN — ENOXAPARIN SODIUM 40 MILLIGRAM(S): 100 INJECTION SUBCUTANEOUS at 11:26

## 2022-02-21 RX ADMIN — EZETIMIBE 10 MILLIGRAM(S): 10 TABLET ORAL at 11:26

## 2022-02-21 NOTE — DISCHARGE NOTE NURSING/CASE MANAGEMENT/SOCIAL WORK - NSDCPEFALRISK_GEN_ALL_CORE
For information on Fall & Injury Prevention, visit: [email or web address] OR  [email or web address] OR  https://www.cdc.gov/steadi/patient.html

## 2022-02-21 NOTE — DISCHARGE NOTE NURSING/CASE MANAGEMENT/SOCIAL WORK - PATIENT PORTAL LINK FT
You can access the FollowMyHealth Patient Portal offered by [facility] by registering at the following website: [email or web address]. By joining [employer]’s FollowMyHealth portal, you will also be able to view your health information using other applications (apps) compatible with our system.

## 2022-11-30 NOTE — ED ADULT NURSE REASSESSMENT NOTE - WEIGHT BEARING STATUS MAINTAINED
bedrest Skin Substitute Text: The defect edges were debeveled with a #15 scalpel blade.  Given the location of the defect, shape of the defect and the proximity to free margins a skin substitute graft was deemed most appropriate.  The graft material was trimmed to fit the size of the defect. The graft was then placed in the primary defect and oriented appropriately.

## 2024-04-16 ENCOUNTER — APPOINTMENT (OUTPATIENT)
Dept: OBGYN | Facility: CLINIC | Age: 74
End: 2024-04-16
Payer: COMMERCIAL

## 2024-04-16 VITALS
DIASTOLIC BLOOD PRESSURE: 76 MMHG | WEIGHT: 122 LBS | HEIGHT: 61 IN | SYSTOLIC BLOOD PRESSURE: 159 MMHG | BODY MASS INDEX: 23.03 KG/M2

## 2024-04-16 DIAGNOSIS — R39.9 UNSPECIFIED SYMPTOMS AND SIGNS INVOLVING THE GENITOURINARY SYSTEM: ICD-10-CM

## 2024-04-16 PROBLEM — Z78.9 OTHER SPECIFIED HEALTH STATUS: Chronic | Status: ACTIVE | Noted: 2022-02-12

## 2024-04-16 LAB
BILIRUB UR QL STRIP: NORMAL
CLARITY UR: NORMAL
COLLECTION METHOD: NORMAL
GLUCOSE UR-MCNC: NORMAL
HCG UR QL: 0.2 EU/DL
HGB UR QL STRIP.AUTO: NORMAL
KETONES UR-MCNC: NORMAL
LEUKOCYTE ESTERASE UR QL STRIP: NORMAL
NITRITE UR QL STRIP: NORMAL
PH UR STRIP: 6
PROT UR STRIP-MCNC: NORMAL
SP GR UR STRIP: >=1.03

## 2024-04-16 PROCEDURE — 99459 PELVIC EXAMINATION: CPT

## 2024-04-16 PROCEDURE — 81002 URINALYSIS NONAUTO W/O SCOPE: CPT

## 2024-04-16 PROCEDURE — 99212 OFFICE O/P EST SF 10 MIN: CPT

## 2024-04-16 RX ORDER — NITROFURANTOIN (MONOHYDRATE/MACROCRYSTALS) 25; 75 MG/1; MG/1
100 CAPSULE ORAL TWICE DAILY
Qty: 10 | Refills: 0 | Status: ACTIVE | COMMUNITY
Start: 2024-04-16 | End: 1900-01-01

## 2024-04-16 NOTE — CHIEF COMPLAINT
[Urgent Visit] : Urgent Visit [FreeTextEntry1] : 75 y/o postmenopausal female presents for an urgent visit c/o dysuria and frequency.  NKDA.  ROS:  Denies fever/chills, HA, Cough/sore throat, CP, SOB, N/V, Diarrhea/Constipation, Pelvic pain, dysuria, urinary urgency and burning, irregular vaginal bleeding, discharge or irritation.

## 2024-04-16 NOTE — PHYSICAL EXAM
[Chaperone Present] : A chaperone was present in the examining room during all aspects of the physical examination [23693] : A chaperone was present during the pelvic exam. [FreeTextEntry2] : Gege HURT [Normal] : uterus [No Bleeding] : there was no active vaginal bleeding [Uterine Adnexae] : were not tender and not enlarged

## 2024-04-19 LAB — BACTERIA UR CULT: ABNORMAL

## 2024-07-25 NOTE — ED PROVIDER NOTE - RELIEVING FACTORS
Current patient location: [address] [place] [id number]    Is the patient currently in the state of [place]? YES    Visit mode:VIDEO    If the visit is dropped, the patient can be reconnected by: VIDEO VISIT: Send to e-mail at: [email or web address]    Will anyone else be joining the visit? NO  (If patient encounters technical issues they should call [phone]    How would you like to obtain your AVS? MyChart    Are changes needed to the allergy or medication list? Pt stated no changes to allergies and Pt stated no med changes    Are refills needed on medications prescribed by this physician? NO    Reason for visit: Consult    Kimmie DE JESUS      
none